# Patient Record
Sex: FEMALE | Race: WHITE | URBAN - METROPOLITAN AREA
[De-identification: names, ages, dates, MRNs, and addresses within clinical notes are randomized per-mention and may not be internally consistent; named-entity substitution may affect disease eponyms.]

---

## 2017-04-11 ENCOUNTER — TELEPHONE (OUTPATIENT)
Dept: OPHTHALMOLOGY | Facility: CLINIC | Age: 82
End: 2017-04-11

## 2017-04-11 NOTE — TELEPHONE ENCOUNTER
Diagnosis questions answered with care facility.  Pt overdue for appt and scheduled appointment next week with dr. erika Benavidez RN 7:51 AM 04/11/17

## 2017-04-20 ENCOUNTER — APPOINTMENT (OUTPATIENT)
Dept: OPHTHALMOLOGY | Facility: CLINIC | Age: 82
End: 2017-04-20
Attending: OPHTHALMOLOGY
Payer: COMMERCIAL

## 2017-04-20 DIAGNOSIS — H04.123 DRY EYES, BILATERAL: ICD-10-CM

## 2017-04-20 DIAGNOSIS — Z96.1 PSEUDOPHAKIA OF BOTH EYES: ICD-10-CM

## 2017-04-20 DIAGNOSIS — H40.1133 PRIMARY OPEN ANGLE GLAUCOMA OF BOTH EYES, SEVERE STAGE: Primary | ICD-10-CM

## 2017-04-20 PROCEDURE — 92083 EXTENDED VISUAL FIELD XM: CPT | Mod: ZF | Performed by: OPHTHALMOLOGY

## 2017-04-20 ASSESSMENT — EXTERNAL EXAM - LEFT EYE: OS_EXAM: NORMAL

## 2017-04-20 ASSESSMENT — PACHYMETRY
OS_CT(UM): 522
OD_CT(UM): 548

## 2017-04-20 ASSESSMENT — SLIT LAMP EXAM - LIDS: COMMENTS: BLEPHARITIS, MGD

## 2017-04-20 ASSESSMENT — VISUAL ACUITY
OS_CC: 20/60
OD_CC+: -1
OS_PH_SC: 20/40+1
OD_SC: 20/70
OD_PH_CC: 20/70 +1
OD_CC: 20/100
OD_PH_SC: 20/50+1
OS_PH_CC: 20/40  -2
CORRECTION_TYPE: GLASSES
METHOD: SNELLEN - LINEAR
OS_CC+: -2

## 2017-04-20 ASSESSMENT — TONOMETRY
OD_IOP_MMHG: UNAB
OS_IOP_MMHG: 12
OD_IOP_MMHG: 33
IOP_METHOD: TONOPEN
OS_IOP_MMHG: 11
IOP_METHOD: APPLANATION

## 2017-04-20 ASSESSMENT — EXTERNAL EXAM - RIGHT EYE: OD_EXAM: NORMAL

## 2017-04-20 ASSESSMENT — CONF VISUAL FIELD
OD_SUPERIOR_TEMPORAL_RESTRICTION: 3
METHOD: COUNTING FINGERS
OD_INFERIOR_NASAL_RESTRICTION: 3
OD_SUPERIOR_NASAL_RESTRICTION: 3
OS_SUPERIOR_TEMPORAL_RESTRICTION: 3
OS_SUPERIOR_NASAL_RESTRICTION: 3

## 2017-04-20 ASSESSMENT — CUP TO DISC RATIO
OD_RATIO: 0.9
OS_RATIO: 0.85

## 2017-04-20 NOTE — NURSING NOTE
Chief Complaints and History of Present Illnesses   Patient presents with     Glaucoma Follow Up     HPI    Affected eye(s):  Both   Symptoms:     Blurred vision   Difficulty with reading   Difficulty watching television   Redness   Foreign body sensation   Tearing   Dryness      Frequency:  Intermittent       Do you have eye pain now?:  No      Comments:  Doing well. No new concerns today. Glaucoma drops being administered by care facility.    Cosopt BID OS  Prednisolone daily OD    Edit: Pt not wearing correct glasses, not hers. Rx incorrect.  Jennie GERMAIN 3:01 PM April 20, 2017

## 2017-04-20 NOTE — PATIENT INSTRUCTIONS
Patient will continue on Cosopt (Timolol/Dorzolamide) which is a blue top drop 2x/day (12 hours apart) in BOTH EYES.  Patient will continue using an eye shield over the right eye while sleeping and refrain from rubbing eyes.  Patient will also continue on Prednisolone 1x/day in the right eye only.  Patient will also continue on artificial tears 4-6x/day in both eyes.  Patient will return to clinic in 1 month with repeat IOP check.

## 2017-04-20 NOTE — PROGRESS NOTES
1)POAG --s/p Ahmed Tube OS (4/11/16),  first dx in 1960, ?s/p Trab in 1980s -- K pachy: 548/522 Tmax: 30s HVF: OD:Superior arcuate and Inf NS and OS:Dense superior and Inferior arcaute, central island CDR:OD:0.7 with loss of inf NRR and OS:0.85 HRT/OCT:Mod-Severe RNFl thinning  FHX of Glc: None Gonio:OD: and OS:open Intolerant to: Asthma/COPD:No Steroid Use: No Kidney Stones: No Sulfa Allergy: Yes, ?sick IOP targets: -- IOP above target OD ?steroid response  2)[place]OL OU -- [place] [facility] [place]  3)H/O Hypotony with choroidals OD -- resolved with new recurrence (8/16) ?2/2 eye rubbing -- needs to wear shield OD qHS at all times  4)KITA/Bleph/Allergic Keratoconjunctivitis -- has seen Dr. 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 Diaz -- improved exam on pred q day    MD:?Patient wearing wrong glasses -- ?etiology of blurred vision  -- pt denies GCA s/s     MD: [facility] administering gtts     Patient will continue on Cosopt (Timolol/Dorzolamide) which is a blue top drop 2x/day (12 hours apart) in BOTH EYES.  Patient will continue using an eye shield over the right eye while sleeping and refrain from rubbing eyes.  Patient will also continue on Prednisolone 1x/day in the right eye only.  Patient will also continue on artificial tears 4-6x/day in both eyes.  Patient will return to clinic in 1 month with repeat IOP check.         Resident Note  S: No new concerns  O: VA down both eyes, possible due to projection versus screen of eye chart  IOP elevated OD today, could not tolerate applanation  Slit lamp with thick mucous on eyelashes OD  Fundus exam stable    Kodi Sweeney MD PGY-3  Ophthalmology    Attending Physician Attestation:  Complete documentation of historical and exam elements from today's encounter can be found in the full encounter summary report (not  reduplicated in this progress note). I personally obtained the chief complaint(s) and history of present illness.  I confirmed and edited as necessary the review of systems, past medical/surgical history, family history, social history, and examination findings as documented by others; and I examined the patient myself. I personally reviewed the relevant tests, images, and reports as documented above. I formulated and edited as necessary the assessment and plan and discussed the findings and management plan with the patient and family.  - Evelyn Knight MD

## 2017-04-20 NOTE — MR AVS SNAPSHOT
After Visit Summary   4/20/2017    Merle Vaughan    MRN: [id number]           Patient Information     Date Of Birth          1935        Visit Information        Provider Department      4/20/2017 1:45 PM Evelyn Knight MD [facility]        Today's Diagnoses     Primary open angle glaucoma of both eyes, severe stage    -  1    Pseudophakia of both eyes        Dry eyes, bilateral          Care Instructions    Patient will continue on Cosopt (Timolol/Dorzolamide) which is a blue top drop 2x/day (12 hours apart) in BOTH EYES.  Patient will continue using an eye shield over the right eye while sleeping and refrain from rubbing eyes.  Patient will also continue on Prednisolone 1x/day in the right eye only.  Patient will also continue on artificial tears 4-6x/day in both eyes.  Patient will return to clinic in 1 month with repeat IOP check.        Follow-ups after your visit        Follow-up notes from your care team     Return in about 1 month (around 5/20/2017).      Who to contact     Please call your clinic at [phone] to:    Ask questions about your health    Make or cancel appointments    Discuss your medicines    Learn about your test results    Speak to your doctor   If you have compliments or concerns about an experience at your clinic, or if you wish to file a complaint, please contact [facility] Patient Relations at [phone] or email us at [email or web address]         Additional Information About Your Visit        MyChart Information     [employer] is an electronic gateway that provides easy, online access to your medical records. With [employer], you can request a clinic appointment, read your test results, renew a prescription or communicate with your care team.     To sign up for [employer] visit the website at [email or web address]   You will be asked to enter the access code listed below, as well as some personal information. Please follow  the directions to create your username and password.     Your access code is: [email or web address]  Expires: 2017  6:31 AM     Your access code will  in 90 days. If you need help or a new code, please contact your [facility] or call [phone] for assistance.        Care EveryWhere ID     This is your Care EveryWhere ID. This could be used by other organizations to access your [place] medical records  [id number]         Blood Pressure from Last 3 Encounters:   No data found for BP    Weight from Last 3 Encounters:   No data found for Wt              We Performed the Following     OVF 24-2 Dynamic OU        Primary Care Provider    Physician No Ref-Primary       No address on file        Thank you!     Thank you for choosing EYE CLINIC  for your care. Our goal is always to provide you with excellent care. Hearing back from our patients is one way we can continue to improve our services. Please take a few minutes to complete the written survey that you may receive in the mail after your visit with us. Thank you!             Your Updated Medication List - Protect others around you: Learn how to safely use, store and throw away your medicines at www.disposemymeds.org.          This list is accurate as of: 17  2:57 PM.  Always use your most recent med list.                   Brand Name Dispense Instructions for use    acetaminophen 325 MG tablet    TYLENOL     Take 650 mg by mouth       ALPHAGAN OP      Place 1 drop into the right eye 2 times daily       Calcium Carbonate 1250 (500 CA) MG Caps      Take 15 mLs by mouth Maalox oral       carboxymethylcellulose 0.5 % Soln ophthalmic solution    REFRESH PLUS     One drop in each eye 4 times daily as needed       clonazePAM 0.5 MG tablet    klonoPIN     Take 0.5 mg by mouth       dorzolamide-timolol 2-0.5 % ophthalmic solution    COSOPT     Place 1 drop into BOTH eyes twice daily.       latanoprost 0.005 % ophthalmic solution     XALATAN     Place 1 drop into the right eye At Bedtime       melatonin 1 MG Caps      Take 5 mg by mouth       mirtazapine 7.5 MG Tabs tablet    REMERON     Take 30 mg by mouth       moxifloxacin 0.5 % ophthalmic solution    VIGAMOX     Place 1 drop into the right eye 6 times daily       prednisoLONE acetate 1 % ophthalmic susp    PRED FORTE     Place 1 drop into the right eye 3 times daily       SIMVASTATIN PO      Take 20 mg by mouth daily

## 2017-06-29 ENCOUNTER — OFFICE VISIT (OUTPATIENT)
Dept: OPHTHALMOLOGY | Facility: CLINIC | Age: 82
End: 2017-06-29
Attending: OPHTHALMOLOGY
Payer: COMMERCIAL

## 2017-06-29 DIAGNOSIS — H40.1133 PRIMARY OPEN ANGLE GLAUCOMA OF BOTH EYES, SEVERE STAGE: Primary | ICD-10-CM

## 2017-06-29 DIAGNOSIS — Z96.1 PSEUDOPHAKIA OF BOTH EYES: ICD-10-CM

## 2017-06-29 PROCEDURE — 92015 DETERMINE REFRACTIVE STATE: CPT | Mod: ZF

## 2017-06-29 PROCEDURE — 99213 OFFICE O/P EST LOW 20 MIN: CPT | Mod: ZF

## 2017-06-29 ASSESSMENT — TONOMETRY
IOP_METHOD: APPLANATION
OS_IOP_MMHG: 18
OS_IOP_MMHG: 26
OD_IOP_MMHG: 34
IOP_METHOD: APPLANATION
OD_IOP_MMHG: 34

## 2017-06-29 ASSESSMENT — REFRACTION_MANIFEST
OS_ADD: +2.75
OD_AXIS: 101
OS_CYLINDER: +1.25
OD_CYLINDER: +1.50
OS_SPHERE: -1.75
OS_AXIS: 074
OD_SPHERE: -0.50
OD_ADD: +2.75

## 2017-06-29 ASSESSMENT — SLIT LAMP EXAM - LIDS: COMMENTS: BLEPHARITIS, MGD

## 2017-06-29 ASSESSMENT — VISUAL ACUITY
METHOD: SNELLEN - LINEAR
OS_CC: 20/50
OS_PH_CC: 20/40
OD_CC: 20/100

## 2017-06-29 ASSESSMENT — EXTERNAL EXAM - RIGHT EYE: OD_EXAM: NORMAL

## 2017-06-29 ASSESSMENT — CONF VISUAL FIELD: OD_SUPERIOR_NASAL_RESTRICTION: 3

## 2017-06-29 ASSESSMENT — CUP TO DISC RATIO
OS_RATIO: 0.85
OD_RATIO: 0.9

## 2017-06-29 ASSESSMENT — REFRACTION_WEARINGRX
OD_AXIS: 015
OS_SPHERE: +1.75
OD_SPHERE: +1.50
OD_CYLINDER: +0.75

## 2017-06-29 ASSESSMENT — EXTERNAL EXAM - LEFT EYE: OS_EXAM: NORMAL

## 2017-06-29 NOTE — MR AVS SNAPSHOT
After Visit Summary   6/29/2017    Merle Vaughan    MRN: [id number]           Patient Information     Date Of Birth          1935        Visit Information        Provider Department      6/29/2017 12:45 PM Evelyn Knight MD [facility] Clinic        Today's Diagnoses     Primary open angle glaucoma of both eyes, severe stage    -  1    Pseudophakia of both eyes          Care Instructions    Patient will continue on Cosopt (Timolol/Dorzolamide) which is a blue top drop 2x/day (12 hours apart) in BOTH EYES and start Lumigan which is a teal top drop at bedtime in the RIGHT EYE ONLY. Patient will also discontinue the Prednisolone and start FML 2x/day in the RIGHT EYE ONLY.   Patient will continue using an eye shield over the right eye while sleeping and refrain from rubbing eyes.    Patient will also continue on artificial tears 4-6x/day in both eyes.  Patient will return to clinic in 1 month with repeat IOP check.          Follow-ups after your visit        Who to contact     Please call your clinic at [phone] to:    Ask questions about your health    Make or cancel appointments    Discuss your medicines    Learn about your test results    Speak to your doctor   If you have compliments or concerns about an experience at your clinic, or if you wish to file a complaint, please contact [facility] Patient Relations at [phone] or email us at [email or web address]         Additional Information About Your Visit        MyChart Information     [employer] is an electronic gateway that provides easy, online access to your medical records. With [employer], you can request a clinic appointment, read your test results, renew a prescription or communicate with your care team.     To sign up for [employer] visit the website at [email or web address]   You will be asked to enter the access code listed below, as well as some personal information. Please follow the directions  to create your username and password.     Your access code is: [email or web address]  Expires: 2017  6:31 AM     Your access code will  in 90 days. If you need help or a new code, please contact your [facility] or call [phone] for assistance.        Care EveryWhere ID     This is your Care EveryWhere ID. This could be used by other organizations to access your [place] medical records  [id number]         Blood Pressure from Last 3 Encounters:   No data found for BP    Weight from Last 3 Encounters:   No data found for Wt              Today, you had the following     No orders found for display       Primary Care Provider    Physician No Ref-Primary       No address on file        Equal Access to Services     ALBINO CALLAWAY : David Greco, wamary hernandez, qaceci kaalmada stephie, ernie rodriguez . So [facility] [phone].    ATENCIÓN: Si habla español, tiene a solis disposición servicios gratuitos de asistencia lingüística. Llame al [phone].    We comply with applicable federal civil rights laws and [place] laws. We do not discriminate on the basis of race, color, national origin, age, disability sex, sexual orientation or gender identity.            Thank you!     Thank you for choosing EYE CLINIC  for your care. Our goal is always to provide you with excellent care. Hearing back from our patients is one way we can continue to improve our services. Please take a few minutes to complete the written survey that you may receive in the mail after your visit with us. Thank you!             Your Updated Medication List - Protect others around you: Learn how to safely use, store and throw away your medicines at www.disposemymeds.org.          This list is accurate as of: 17  1:31 PM.  Always use your most recent med list.                   Brand Name Dispense Instructions for use Diagnosis    acetaminophen 325 MG tablet    TYLENOL      Take 650 mg by mouth        ALPHAGAN OP      Place 1 drop into the right eye 2 times daily        Calcium Carbonate 1250 (500 CA) MG Caps      Take 15 mLs by mouth Maalox oral        carboxymethylcellulose 0.5 % Soln ophthalmic solution    REFRESH PLUS     One drop in each eye 4 times daily as needed        clonazePAM 0.5 MG tablet    klonoPIN     Take 0.5 mg by mouth        dorzolamide-timolol 2-0.5 % ophthalmic solution    COSOPT     Place 1 drop into BOTH eyes twice daily.        latanoprost 0.005 % ophthalmic solution    XALATAN     Place 1 drop into the right eye At Bedtime    Primary open angle glaucoma of both eyes, severe stage       melatonin 1 MG Caps      Take 5 mg by mouth        mirtazapine 7.5 MG Tabs tablet    REMERON     Take 30 mg by mouth        moxifloxacin 0.5 % ophthalmic solution    VIGAMOX     Place 1 drop into the right eye 6 times daily        prednisoLONE acetate 1 % ophthalmic susp    PRED FORTE     Place 1 drop into the right eye 3 times daily        SIMVASTATIN PO      Take 20 mg by mouth daily

## 2017-06-29 NOTE — PROGRESS NOTES
1)POAG --s/p Ahmed Tube OS (4/11/16),  first dx in 1960, ?s/p Trab in 1980s -- K pachy: 548/522 Tmax: 30s HVF: OD:Superior arcuate and Inf NS and OS:Dense superior and Inferior arcaute, central island CDR:OD:0.7 with loss of inf NRR and OS:0.85 HRT/OCT:Mod-Severe RNFl thinning  FHX of Glc: None Gonio:OD: and OS:open Intolerant to: Asthma/COPD:No Steroid Use: No Kidney Stones: No Sulfa Allergy: Yes, ?sick IOP targets: -- IOP above target OD ?steroid response  2)PCIOL OU -- [place] [facility] [place] lives in [place]  3)H/O Hypotony with choroidals OD -- resolved with new recurrence (8/16) ?2/2 eye rubbing -- needs to wear shield OD qHS at all times  4)KITA/Bleph/Allergic Keratoconjunctivitis -- has seen Dr. 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 Diaz -- improved exam on pred q day    MD: [facility] administering gtts     Patient will continue on Cosopt (Timolol/Dorzolamide) which is a blue top drop 2x/day (12 hours apart) in BOTH EYES and start Lumigan which is a teal top drop at bedtime in the RIGHT EYE ONLY. Patient will also discontinue the Prednisolone and start FML 2x/day in the RIGHT EYE ONLY.   Patient will continue using an eye shield over the right eye while sleeping and refrain from rubbing eyes.    Patient will also continue on artificial tears 4-6x/day in both eyes.  Patient will return to clinic in 1 month with repeat IOP check.     Attending Physician Attestation:  Complete documentation of historical and exam elements from today's encounter can be found in the full encounter summary report (not reduplicated in this progress note). I personally obtained the chief complaint(s) and history of present illness.  I confirmed and edited as necessary the review of systems, past medical/surgical history, family history, social history, and examination findings as documented by  others; and I examined the patient myself. I personally reviewed the relevant tests, images, and reports as documented above. I formulated and edited as necessary the assessment and plan and discussed the findings and management plan with the patient and family.  - Evelyn Knight MD

## 2017-06-29 NOTE — PATIENT INSTRUCTIONS
Patient will continue on Cosopt (Timolol/Dorzolamide) which is a blue top drop 2x/day (12 hours apart) in BOTH EYES and start Lumigan which is a teal top drop at bedtime in the RIGHT EYE ONLY. Patient will also discontinue the Prednisolone and start FML 2x/day in the RIGHT EYE ONLY.   Patient will continue using an eye shield over the right eye while sleeping and refrain from rubbing eyes.    Patient will also continue on artificial tears 4-6x/day in both eyes.  Patient will return to clinic in 1 month with repeat IOP check.

## 2017-06-29 NOTE — NURSING NOTE
Chief Complaints and History of Present Illnesses   Patient presents with     Follow Up For     2 month follow up POAG BE     HPI    Affected eye(s):  Both   Symptoms:     No floaters   No flashes   No glare   No halos      Duration:  2 months   Frequency:  Constant       Do you have eye pain now?:  No      Comments:  2 month follow up POAG  cosopt BID BE  Prednisolone QD RE  Tears QID BE  Perri GERMAIN 12:44 PM June 29, 2017

## 2017-12-14 ENCOUNTER — OFFICE VISIT (OUTPATIENT)
Dept: OPHTHALMOLOGY | Facility: CLINIC | Age: 82
End: 2017-12-14
Attending: OPHTHALMOLOGY
Payer: COMMERCIAL

## 2017-12-14 DIAGNOSIS — H40.1133 PRIMARY OPEN ANGLE GLAUCOMA OF BOTH EYES, SEVERE STAGE: Primary | ICD-10-CM

## 2017-12-14 DIAGNOSIS — Z96.1 PSEUDOPHAKIA OF BOTH EYES: ICD-10-CM

## 2017-12-14 PROCEDURE — 99213 OFFICE O/P EST LOW 20 MIN: CPT | Mod: ZF

## 2017-12-14 ASSESSMENT — EXTERNAL EXAM - LEFT EYE: OS_EXAM: NORMAL

## 2017-12-14 ASSESSMENT — CONF VISUAL FIELD
METHOD: COUNTING FINGERS
OD_SUPERIOR_NASAL_RESTRICTION: 3
OS_NORMAL: 1

## 2017-12-14 ASSESSMENT — VISUAL ACUITY
OS_SC: 20/40
METHOD: SNELLEN - LINEAR
OD_SC: 20/100
OD_PH_SC: 20/80
OS_PH_SC: 20/30

## 2017-12-14 ASSESSMENT — CUP TO DISC RATIO
OS_RATIO: 0.8
OD_RATIO: 0.9

## 2017-12-14 ASSESSMENT — TONOMETRY
OS_IOP_MMHG: 13
IOP_METHOD: APPLANATION
IOP_METHOD: APPLANATION
OD_IOP_MMHG: 26
OD_IOP_MMHG: 29
OS_IOP_MMHG: 16

## 2017-12-14 ASSESSMENT — REFRACTION_WEARINGRX
OD_CYLINDER: +0.75
OS_SPHERE: +1.75
OD_SPHERE: +1.50
OD_AXIS: 015

## 2017-12-14 ASSESSMENT — SLIT LAMP EXAM - LIDS
COMMENTS: BLEPHARITIS, MGD
COMMENTS: BLEPHARITIS, MGD

## 2017-12-14 ASSESSMENT — EXTERNAL EXAM - RIGHT EYE: OD_EXAM: NORMAL

## 2017-12-14 NOTE — NURSING NOTE
Chief Complaints and History of Present Illnesses   Patient presents with     Follow Up For     POAG      HPI    Affected eye(s):  Both   Symptoms:        Frequency:  Constant       Do you have eye pain now?:  No      Comments:  States va is the same since last visit  No F&F  Romana FINN 9:01 AM December 14, 2017

## 2017-12-14 NOTE — MR AVS SNAPSHOT
After Visit Summary   12/14/2017    Merle Vaughan    MRN: [id number]           Patient Information     Date Of Birth          1935        Visit Information        Provider Department      12/14/2017 8:45 AM Evelyn Knight MD [facility] Clinic        Today's Diagnoses     Primary open angle glaucoma of both eyes, severe stage    -  1    Pseudophakia of both eyes          Care Instructions    Patient will continue on Cosopt (Timolol/Dorzolamide) which is a blue top drop 2x/day (12 hours apart) in BOTH EYES and Lumigan which is a teal top drop at bedtime in the RIGHT EYE ONLY. Patient will also continue on FML 1x/day in the RIGHT EYE ONLY and start Alphagan (Brimonidine) which is a purple top drop 2x/day (12 hours apart) in the RIGHT EYE ONLY.     Patient will continue using an eye shield over the right eye while sleeping and refrain from rubbing eyes.        Patient will also continue on artificial tears 4-6x/day in both eyes.      Patient will return to clinic in 2-3 weeks with repeat IOP check, visual field test and discussion regarding possible tube surgery in the right eye.               Follow-ups after your visit        Follow-up notes from your care team     Return 2-3 weeks with repeat IOP check, visual field test and discussion regarding possible tube surgery in.      Your next 10 appointments already scheduled     Jan 04, 2018 10:30 AM CST   RETURN GLAUCOMA with Evelyn Knight MD   Eye Clinic ([facility])    [facility]  [address]  [address]  [facility] [address]   [phone]            Jan 25, 2018 10:30 AM CST   Post-Op with Evelyn Knight MD   Eye Clinic ([facility])    T[facility] Pereira  [address]  [address]  [facility] [address]   [phone]            Jan 30, 2018  1:00 PM CST   Post-Op with Evelyn Knight MD   Eye Clinic ([facility])    Taran Pereira  [address]  [address]  [facility] [address]   [phone]              Who to contact     Please call your clinic at [phone] to:    Ask questions about your health    Make or cancel appointments    Discuss your medicines    Learn about your test results    Speak to your doctor   If you have compliments or concerns about an experience at your clinic, or if you wish to file a complaint, please contact [facility] Phys[facility] Patient Relations at [phone] or email us at [email or web address]         Additional Information About Your Visit        [employer] Information     [employer] is an electronic gateway that provides easy, online access to your medical records. With [employer], you can request a clinic appointment, read your test results, renew a prescription or communicate with your care team.     To sign up for [employer] visit the website at [email or web address]   You will be asked to enter the access code listed below, as well as some personal information. Please follow the directions to create your username and password.     Your access code is: [email or web address]  Expires: 2018  6:30 AM     Your access code will  in 90 days. If you need help or a new code, please contact your [facility] or call [phone] for assistance.        Care EveryWhere ID     This is your Care EveryWhere ID. This could be used by other organizations to access your [place] medical records  [id number]         Blood Pressure from Last 3 Encounters:   No data found for BP    Weight from Last 3 Encounters:   No data found for Wt              We Performed the Following     Deisy-Operative Worksheet (Glaucoma)        Primary Care Provider Fax #    Physician No Ref-Primary [phone]       No address on file        Equal Access to Services     ALBINO CALLAWAY : mik Dodd, ernie quinteros. So [facility]  [phone].    ATENCIÓN: Si luis angel mcgee, tiene a solis disposición servicios gratuitos de asistencia lingüística. Demar dowling [phone].    We comply with applicable federal civil rights laws and [place] laws. We do not discriminate on the basis of race, color, national origin, age, disability, sex, sexual orientation, or gender identity.            Thank you!     Thank you for choosing EYE CLINIC  for your care. Our goal is always to provide you with excellent care. Hearing back from our patients is one way we can continue to improve our services. Please take a few minutes to complete the written survey that you may receive in the mail after your visit with us. Thank you!             Your Updated Medication List - Protect others around you: Learn how to safely use, store and throw away your medicines at www.disposemymeds.org.          This list is accurate as of: 12/14/17 10:26 AM.  Always use your most recent med list.                   Brand Name Dispense Instructions for use Diagnosis    acetaminophen 325 MG tablet    TYLENOL     Take 650 mg by mouth        ALPHAGAN OP      Place 1 drop into the right eye 2 times daily        Calcium Carbonate 1250 (500 CA) MG Caps      Take 15 mLs by mouth Maalox oral        carboxymethylcellulose 0.5 % Soln ophthalmic solution    REFRESH PLUS     One drop in each eye 4 times daily as needed        clonazePAM 0.5 MG tablet    klonoPIN     Take 0.5 mg by mouth        dorzolamide-timolol 2-0.5 % ophthalmic solution    COSOPT     Place 1 drop into BOTH eyes twice daily.        latanoprost 0.005 % ophthalmic solution    XALATAN     Place 1 drop into the right eye At Bedtime    Primary open angle glaucoma of both eyes, severe stage       melatonin 1 MG Caps      Take 5 mg by mouth        mirtazapine 7.5 MG Tabs tablet    REMERON     Take 30 mg by mouth        moxifloxacin 0.5 % ophthalmic solution    VIGAMOX     Place 1 drop into the right eye 6 times daily        prednisoLONE  acetate 1 % ophthalmic susp    PRED FORTE     Place 1 drop into the right eye 3 times daily        SIMVASTATIN PO      Take 20 mg by mouth daily

## 2017-12-14 NOTE — PROGRESS NOTES
1)POAG --s/p Ahmed Tube OS (4/11/16),  first dx in 1960, ?s/p Trab in 1980s -- K pachy: 548/522 Tmax: 30s HVF: OD:Superior arcuate and Inf NS and OS:Dense superior and Inferior arcaute, central island CDR:OD:0.9 (prog noted in 2017 after being lost to f/u) and OS:0.85 HRT/OCT:Mod-Severe RNFl thinning  FHX of Glc: None Gonio:OD: and OS:open Intolerant to: Asthma/COPD:No Steroid Use: No Kidney Stones: No Sulfa Allergy: Yes, ?sick IOP targets: -- IOP above target OD ?steroid response  2)PCIOL OU -- [place] [facility] [place] son (gurjit) lives in [place]  3)H/O Hypotony with choroidals OD -- resolved with new recurrence (8/16) ?2/2 eye rubbing -- needs to wear shield OD qHS at all times  4)KITA/Bleph/Allergic Keratoconjunctivitis -- has seen Dr. 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 Diaz -- stable on FML BID    MD: [facility] administering gtts     MD:pt lost to f/u from 6/17 to 12/17 -- pt wants me to call Gurjit [phone]    Patient will continue on Cosopt (Timolol/Dorzolamide) which is a blue top drop 2x/day (12 hours apart) in BOTH EYES and Lumigan which is a teal top drop at bedtime in the RIGHT EYE ONLY. Patient will also continue on FML 1x/day in the RIGHT EYE ONLY and start Alphagan (Brimonidine) which is a purple top drop 2x/day (12 hours apart) in the RIGHT EYE ONLY.     Patient will continue using an eye shield over the right eye while sleeping and refrain from rubbing eyes.        Patient will also continue on artificial tears 4-6x/day in both eyes.      Patient will return to clinic in 2-3 weeks with repeat IOP check, visual field test and discussion regarding possible tube surgery in the right eye.     Attending Physician Attestation:  Complete documentation of historical and exam elements from today's encounter can be found in the full encounter summary report (not reduplicated in  this progress note). I personally obtained the chief complaint(s) and history of present illness.  I confirmed and edited as necessary the review of systems, past medical/surgical history, family history, social history, and examination findings as documented by others; and I examined the patient myself. I personally reviewed the relevant tests, images, and reports as documented above. I formulated and edited as necessary the assessment and plan and discussed the findings and management plan with the patient and family.  - Evelyn Knight MD     Resident Note  Per [facility] record, patient still using FML twice a day right eye. Recommend stopping FML. Recheck IOP one month. Considering adding alphagan vs tube right eye at that time.    Franco Browning MD  PGY3, Dept of Ophthalmology

## 2017-12-14 NOTE — PATIENT INSTRUCTIONS
Patient will continue on Cosopt (Timolol/Dorzolamide) which is a blue top drop 2x/day (12 hours apart) in BOTH EYES and Lumigan which is a teal top drop at bedtime in the RIGHT EYE ONLY. Patient will also continue on FML 1x/day in the RIGHT EYE ONLY and start Alphagan (Brimonidine) which is a purple top drop 2x/day (12 hours apart) in the RIGHT EYE ONLY.     Patient will continue using an eye shield over the right eye while sleeping and refrain from rubbing eyes.        Patient will also continue on artificial tears 4-6x/day in both eyes.      Patient will return to clinic in 2-3 weeks with repeat IOP check, visual field test and discussion regarding possible tube surgery in the right eye.

## 2018-01-04 ENCOUNTER — OFFICE VISIT (OUTPATIENT)
Dept: OPHTHALMOLOGY | Facility: CLINIC | Age: 83
End: 2018-01-04
Attending: OPHTHALMOLOGY
Payer: COMMERCIAL

## 2018-01-04 DIAGNOSIS — H40.1133 PRIMARY OPEN ANGLE GLAUCOMA OF BOTH EYES, SEVERE STAGE: ICD-10-CM

## 2018-01-04 DIAGNOSIS — Z96.1 PSEUDOPHAKIA OF BOTH EYES: ICD-10-CM

## 2018-01-04 DIAGNOSIS — H40.1190 POAG (PRIMARY OPEN-ANGLE GLAUCOMA): Primary | ICD-10-CM

## 2018-01-04 PROCEDURE — 92083 EXTENDED VISUAL FIELD XM: CPT | Mod: ZF | Performed by: OPHTHALMOLOGY

## 2018-01-04 PROCEDURE — G0463 HOSPITAL OUTPT CLINIC VISIT: HCPCS | Mod: ZF

## 2018-01-04 ASSESSMENT — CONF VISUAL FIELD
OD_INFERIOR_NASAL_RESTRICTION: 3
OS_SUPERIOR_TEMPORAL_RESTRICTION: 3
OD_INFERIOR_TEMPORAL_RESTRICTION: 3
OD_SUPERIOR_NASAL_RESTRICTION: 1
OD_SUPERIOR_TEMPORAL_RESTRICTION: 3
OS_SUPERIOR_NASAL_RESTRICTION: 3

## 2018-01-04 ASSESSMENT — VISUAL ACUITY
OS_SC+: -2
OD_SC+: +1
OS_SC: 20/30
OD_SC: 20/100
METHOD: SNELLEN - LINEAR

## 2018-01-04 ASSESSMENT — REFRACTION_WEARINGRX
OS_SPHERE: +1.75
OD_AXIS: 015
OD_CYLINDER: +0.75
OD_SPHERE: +1.50

## 2018-01-04 ASSESSMENT — SLIT LAMP EXAM - LIDS
COMMENTS: BLEPHARITIS, MGD
COMMENTS: BLEPHARITIS, MGD

## 2018-01-04 ASSESSMENT — TONOMETRY
OD_IOP_MMHG: 15
IOP_METHOD: TONOPEN
OS_IOP_MMHG: 15
OD_IOP_MMHG: 15
OS_IOP_MMHG: 13
IOP_METHOD: APPLANATION

## 2018-01-04 ASSESSMENT — EXTERNAL EXAM - LEFT EYE: OS_EXAM: NORMAL

## 2018-01-04 ASSESSMENT — EXTERNAL EXAM - RIGHT EYE: OD_EXAM: NORMAL

## 2018-01-04 ASSESSMENT — CUP TO DISC RATIO
OD_RATIO: 0.9
OS_RATIO: 0.8

## 2018-01-04 NOTE — MR AVS SNAPSHOT
After Visit Summary   1/4/2018    Merle Vaughan    MRN: [id number]           Patient Information     Date Of Birth          1935        Visit Information        Provider Department      1/4/2018 10:30 AM Evelyn Knight MD [facility]        Today's Diagnoses     POAG (primary open-angle glaucoma)    -  1    Primary open angle glaucoma of both eyes, severe stage        Pseudophakia of both eyes          Care Instructions    Patient will continue on Cosopt (Timolol/Dorzolamide) which is a blue top drop 2x/day (12 hours apart) in BOTH EYES and Lumigan which is a teal top drop at bedtime in the RIGHT EYE ONLY. Patient will also continue on FML 1x/day in the RIGHT EYE ONLY and Alphagan (Brimonidine) which is a purple top drop 2x/day (12 hours apart) in the RIGHT EYE ONLY.     Patient will continue using an eye shield over the right eye while sleeping and refrain from rubbing eyes.        Patient will also continue on artificial tears 4-6x/day in both eyes.      Patient will return to clinic in 1-2 months with repeat IOP check, visual field test (OD:LVC only) and discussion regarding possible tube surgery in the right eye.          Follow-ups after your visit        Follow-up notes from your care team     Return 2 months with repeat IOP check, visual field test (OD:LVC only) .      Your next 10 appointments already scheduled     Jan 24, 2018   Procedure with Evelyn Knight MD   [facility] ([facility])    [address] [place], [place] [id number] [phone]           Located in the Clinics and Surgery Center at [address] [place] [address].   parking is very convenient and highly recommended.  is a $6 flat rate fee.  Both  and self parkers should enter the main arrival plaza from [facility]; parking attendants will direct you based on your parking preference.            Jan 25, 2018 10:30 AM CST    Post-Op with Evelyn Knight MD   Eye Clinic ([facility])    [address]  [address]  [facility] [place] [address]   [phone]            2018  1:00 PM CST   Post-Op with Evelyn Knight MD   Eye Clinic ([facility])    [address]  [address]  [address]  [facility] [place] [address]   [phone]              Who to contact     Please call your clinic at [phone] to:    Ask questions about your health    Make or cancel appointments    Discuss your medicines    Learn about your test results    Speak to your doctor   If you have compliments or concerns about an experience at your clinic, or if you wish to file a complaint, please contact [facility] Patient Relations at [phone] or email us at [email or web address]         Additional Information About Your Visit        [employer] Information     [employer] is an electronic gateway that provides easy, online access to your medical records. With [employer], you can request a clinic appointment, read your test results, renew a prescription or communicate with your care team.     To sign up for [employer] visit the website at [email or web address]   You will be asked to enter the access code listed below, as well as some personal information. Please follow the directions to create your username and password.     Your access code is: [email or web address]  Expires: 2018  6:30 AM     Your access code will  in 90 days. If you need help or a new code, please contact your [facility] or call [phone] for assistance.        Care EveryWhere ID     This is your Care EveryWhere ID. This could be used by other organizations to access your [place] medical records  [id number]         Blood Pressure from Last 3 Encounters:   No data found for BP    Weight from Last 3 Encounters:   No data found for Wt              We  Performed the Following     OVF 24-2 Dynamic OU        Primary Care Provider Fax #    Physician No Ref-Primary [phone]       No address on file        Equal Access to Services     ALBINO CALLAWAY : Hadii aad ku hadmauricepaolo Greco, mik geminidarinha, rosy card, ernie harriscally green. So [facility] [phone].    ATENCIÓN: Si habla español, tiene a solis disposición servicios gratuitos de asistencia lingüística. Llame al [phone].    We comply with applicable federal civil rights laws and [place] laws. We do not discriminate on the basis of race, color, national origin, age, disability, sex, sexual orientation, or gender identity.            Thank you!     Thank you for choosing [facility]  for your care. Our goal is always to provide you with excellent care. Hearing back from our patients is one way we can continue to improve our services. Please take a few minutes to complete the written survey that you may receive in the mail after your visit with us. Thank you!             Your Updated Medication List - Protect others around you: Learn how to safely use, store and throw away your medicines at www.disposemymeds.org.          This list is accurate as of: 1/4/18 12:12 PM.  Always use your most recent med list.                   Brand Name Dispense Instructions for use Diagnosis    acetaminophen 325 MG tablet    TYLENOL     Take 650 mg by mouth        ALPHAGAN OP      Place 1 drop into the right eye 2 times daily        Calcium Carbonate 1250 (500 CA) MG Caps      Take 15 mLs by mouth Maalox oral        carboxymethylcellulose 0.5 % Soln ophthalmic solution    REFRESH PLUS     One drop in each eye 4 times daily as needed        clonazePAM 0.5 MG tablet    klonoPIN     Take 0.5 mg by mouth        dorzolamide-timolol 2-0.5 % ophthalmic solution    COSOPT     Place 1 drop into BOTH eyes twice daily.        FLUOROMETHOLONE OP      Apply 1 drop to eye daily Right eye, per med sheet with pt         latanoprost 0.005 % ophthalmic solution    XALATAN     Place 1 drop into the right eye At Bedtime    Primary open angle glaucoma of both eyes, severe stage       melatonin 1 MG Caps      Take 5 mg by mouth        mirtazapine 7.5 MG Tabs tablet    REMERON     Take 30 mg by mouth        moxifloxacin 0.5 % ophthalmic solution    VIGAMOX     Place 1 drop into the right eye 6 times daily        prednisoLONE acetate 1 % ophthalmic susp    PRED FORTE     Place 1 drop into the right eye 3 times daily        SIMVASTATIN PO      Take 20 mg by mouth daily

## 2018-01-04 NOTE — PROGRESS NOTES
1)POAG --s/p Ahmed Tube OS (4/11/16),  first dx in 1960, ?s/p Trab in 1980s, progression OD in 2018 when pt lost to f/u  -- K pachy: 548/522 Tmax: 30s HVF: OD:Central island in 2018 (prog when pt lost to f/u) and Superior arcuate and Inf NS in [id number] and OS:Dense superior and Inferior arcaute, central island CDR:OD:0.9 (prog noted in 2017 after being lost to f/u) and OS:0.85 HRT/OCT:Mod-Severe RNFl thinning  FHX of Glc: None Gonio:OD: and OS:open Intolerant to: Asthma/COPD:No Steroid Use: No Kidney Stones: No Sulfa Allergy: Yes, ?sick IOP targets: -- IOP improved  2)PCIOL OU -- [place] [facility] [place], son (gurjit) lives in [place]  3)H/O Hypotony with choroidals OD -- resolved with new recurrence (8/16) ?2/2 eye rubbing -- needs to wear shield OD qHS at all times  4)KITA/Bleph/Allergic Keratoconjunctivitis -- has seen Dr. 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 Diaz -- was stable on FML BID    MD: [facility] administering gtts     MD:pt lost to f/u from 6/17 to 12/17  -- etiology of progression OD -- pt wants me to call Gurjit [phone]    Patient will continue on Cosopt (Timolol/Dorzolamide) which is a blue top drop 2x/day (12 hours apart) in BOTH EYES and Lumigan which is a teal top drop at bedtime in the RIGHT EYE ONLY. Patient will also continue on FML 1x/day in the RIGHT EYE ONLY and Alphagan (Brimonidine) which is a purple top drop 2x/day (12 hours apart) in the RIGHT EYE ONLY.     Patient will continue using an eye shield over the right eye while sleeping and refrain from rubbing eyes.        Patient will also continue on artificial tears 4-6x/day in both eyes.      Patient will return to clinic in 1-2 months with repeat IOP check, visual field test (OD:LVC only) and discussion regarding possible tube surgery in the right eye.     Attending Physician Attestation:   Complete documentation of historical and exam elements from today's encounter can be found in the full encounter summary report (not reduplicated in this progress note). I personally obtained the chief complaint(s) and history of present illness.  I confirmed and edited as necessary the review of systems, past medical/surgical history, family history, social history, and examination findings as documented by others; and I examined the patient myself. I personally reviewed the relevant tests, images, and reports as documented above. I formulated and edited as necessary the assessment and plan and discussed the findings and management plan with the patient and family.  - Evelyn Knight MD     Resident Note  Broke glasses. IOP improved after decreasing FML to 1x day and starting alphagan. Clear progression on visual field but not to confrontation. Recommend continue cosopt both eyes, lumigan right eye, alphagan right eye and fml 1xdaily right eye. Return to clinic 1 month [facility]    MRx from 6/2017 reginaldo Browning MD  PGY3, Dept of Ophthalmology

## 2018-01-04 NOTE — PATIENT INSTRUCTIONS
Patient will continue on Cosopt (Timolol/Dorzolamide) which is a blue top drop 2x/day (12 hours apart) in BOTH EYES and Lumigan which is a teal top drop at bedtime in the RIGHT EYE ONLY. Patient will also continue on FML 1x/day in the RIGHT EYE ONLY and Alphagan (Brimonidine) which is a purple top drop 2x/day (12 hours apart) in the RIGHT EYE ONLY.     Patient will continue using an eye shield over the right eye while sleeping and refrain from rubbing eyes.        Patient will also continue on artificial tears 4-6x/day in both eyes.      Patient will return to clinic in 1-2 months with repeat IOP check, visual field test (OD:LVC only) and discussion regarding possible tube surgery in the right eye.

## 2018-01-09 ENCOUNTER — RECORDS - HEALTHEAST (OUTPATIENT)
Dept: LAB | Facility: CLINIC | Age: 83
End: 2018-01-09

## 2018-01-10 LAB
ANION GAP SERPL CALCULATED.3IONS-SCNC: 5 MMOL/L (ref 5–18)
BASOPHILS # BLD AUTO: 0 THOU/UL (ref 0–0.2)
BASOPHILS NFR BLD AUTO: 1 % (ref 0–2)
BUN SERPL-MCNC: 16 MG/DL (ref 8–28)
CALCIUM SERPL-MCNC: 9.4 MG/DL (ref 8.5–10.5)
CHLORIDE BLD-SCNC: 103 MMOL/L (ref 98–107)
CO2 SERPL-SCNC: 30 MMOL/L (ref 22–31)
CREAT SERPL-MCNC: 0.59 MG/DL (ref 0.6–1.1)
EOSINOPHIL # BLD AUTO: 0.5 THOU/UL (ref 0–0.4)
EOSINOPHIL NFR BLD AUTO: 8 % (ref 0–6)
ERYTHROCYTE [DISTWIDTH] IN BLOOD BY AUTOMATED COUNT: 13.7 % (ref 11–14.5)
GFR SERPL CREATININE-BSD FRML MDRD: >60 ML/MIN/1.73M2
GLUCOSE BLD-MCNC: 67 MG/DL (ref 70–125)
HCT VFR BLD AUTO: 35.2 % (ref 35–47)
HGB BLD-MCNC: 11.5 G/DL (ref 12–16)
LYMPHOCYTES # BLD AUTO: 1.9 THOU/UL (ref 0.8–4.4)
LYMPHOCYTES NFR BLD AUTO: 32 % (ref 20–40)
MCH RBC QN AUTO: 30.5 PG (ref 27–34)
MCHC RBC AUTO-ENTMCNC: 32.7 G/DL (ref 32–36)
MCV RBC AUTO: 93 FL (ref 80–100)
MONOCYTES # BLD AUTO: 0.9 THOU/UL (ref 0–0.9)
MONOCYTES NFR BLD AUTO: 14 % (ref 2–10)
NEUTROPHILS # BLD AUTO: 2.7 THOU/UL (ref 2–7.7)
NEUTROPHILS NFR BLD AUTO: 45 % (ref 50–70)
PLATELET # BLD AUTO: 307 THOU/UL (ref 140–440)
PMV BLD AUTO: 11 FL (ref 8.5–12.5)
POTASSIUM BLD-SCNC: 3.9 MMOL/L (ref 3.5–5)
RBC # BLD AUTO: 3.77 MILL/UL (ref 3.8–5.4)
SODIUM SERPL-SCNC: 138 MMOL/L (ref 136–145)
WBC: 6 THOU/UL (ref 4–11)

## 2018-01-23 ENCOUNTER — ANESTHESIA EVENT (OUTPATIENT)
Dept: SURGERY | Facility: AMBULATORY SURGERY CENTER | Age: 83
End: 2018-01-23

## 2018-01-24 ENCOUNTER — ANESTHESIA (OUTPATIENT)
Dept: SURGERY | Facility: AMBULATORY SURGERY CENTER | Age: 83
End: 2018-01-24

## 2018-01-24 ENCOUNTER — SURGERY (OUTPATIENT)
Age: 83
End: 2018-01-24

## 2018-01-24 ENCOUNTER — HOSPITAL ENCOUNTER (OUTPATIENT)
Facility: AMBULATORY SURGERY CENTER | Age: 83
End: 2018-01-24
Attending: OPHTHALMOLOGY
Payer: COMMERCIAL

## 2018-01-24 VITALS
OXYGEN SATURATION: 93 % | RESPIRATION RATE: 16 BRPM | WEIGHT: 133 LBS | HEIGHT: 68 IN | DIASTOLIC BLOOD PRESSURE: 66 MMHG | TEMPERATURE: 98 F | SYSTOLIC BLOOD PRESSURE: 140 MMHG | BODY MASS INDEX: 20.16 KG/M2

## 2018-01-24 DIAGNOSIS — H40.1133 PRIMARY OPEN ANGLE GLAUCOMA OF BOTH EYES, SEVERE STAGE: Primary | ICD-10-CM

## 2018-01-24 DEVICE — EYE IMP VALVE AHMED FLEXIBLE FP7: Type: IMPLANTABLE DEVICE | Site: EYE | Status: FUNCTIONAL

## 2018-01-24 DEVICE — EYE IMP GRAFT VISIONGRAFT CORNEA 1/2MOON 9MM CO301AL-90: Type: IMPLANTABLE DEVICE | Site: EYE | Status: FUNCTIONAL

## 2018-01-24 RX ORDER — MEPERIDINE HYDROCHLORIDE 25 MG/ML
12.5 INJECTION INTRAMUSCULAR; INTRAVENOUS; SUBCUTANEOUS
Status: DISCONTINUED | OUTPATIENT
Start: 2018-01-24 | End: 2018-01-25 | Stop reason: HOSPADM

## 2018-01-24 RX ORDER — NALOXONE HYDROCHLORIDE 0.4 MG/ML
.1-.4 INJECTION, SOLUTION INTRAMUSCULAR; INTRAVENOUS; SUBCUTANEOUS
Status: DISCONTINUED | OUTPATIENT
Start: 2018-01-24 | End: 2018-01-25 | Stop reason: HOSPADM

## 2018-01-24 RX ORDER — SODIUM CHLORIDE, SODIUM LACTATE, POTASSIUM CHLORIDE, CALCIUM CHLORIDE 600; 310; 30; 20 MG/100ML; MG/100ML; MG/100ML; MG/100ML
INJECTION, SOLUTION INTRAVENOUS CONTINUOUS
Status: DISCONTINUED | OUTPATIENT
Start: 2018-01-24 | End: 2018-01-25 | Stop reason: HOSPADM

## 2018-01-24 RX ORDER — DEXAMETHASONE SODIUM PHOSPHATE 4 MG/ML
INJECTION, SOLUTION INTRA-ARTICULAR; INTRALESIONAL; INTRAMUSCULAR; INTRAVENOUS; SOFT TISSUE PRN
Status: DISCONTINUED | OUTPATIENT
Start: 2018-01-24 | End: 2018-01-24

## 2018-01-24 RX ORDER — FENTANYL CITRATE 50 UG/ML
25-50 INJECTION, SOLUTION INTRAMUSCULAR; INTRAVENOUS
Status: DISCONTINUED | OUTPATIENT
Start: 2018-01-24 | End: 2018-01-24 | Stop reason: HOSPADM

## 2018-01-24 RX ORDER — ONDANSETRON 2 MG/ML
4 INJECTION INTRAMUSCULAR; INTRAVENOUS EVERY 30 MIN PRN
Status: DISCONTINUED | OUTPATIENT
Start: 2018-01-24 | End: 2018-01-25 | Stop reason: HOSPADM

## 2018-01-24 RX ORDER — ACETAMINOPHEN 325 MG/1
975 TABLET ORAL ONCE
Status: COMPLETED | OUTPATIENT
Start: 2018-01-24 | End: 2018-01-24

## 2018-01-24 RX ORDER — BALANCED SALT SOLUTION 6.4; .75; .48; .3; 3.9; 1.7 MG/ML; MG/ML; MG/ML; MG/ML; MG/ML; MG/ML
SOLUTION OPHTHALMIC PRN
Status: DISCONTINUED | OUTPATIENT
Start: 2018-01-24 | End: 2018-01-24 | Stop reason: HOSPADM

## 2018-01-24 RX ORDER — SODIUM CHLORIDE, SODIUM LACTATE, POTASSIUM CHLORIDE, CALCIUM CHLORIDE 600; 310; 30; 20 MG/100ML; MG/100ML; MG/100ML; MG/100ML
INJECTION, SOLUTION INTRAVENOUS CONTINUOUS
Status: DISCONTINUED | OUTPATIENT
Start: 2018-01-24 | End: 2018-01-24 | Stop reason: HOSPADM

## 2018-01-24 RX ORDER — FENTANYL CITRATE 50 UG/ML
25-50 INJECTION, SOLUTION INTRAMUSCULAR; INTRAVENOUS
Status: DISCONTINUED | OUTPATIENT
Start: 2018-01-24 | End: 2018-01-25 | Stop reason: HOSPADM

## 2018-01-24 RX ORDER — LABETALOL HYDROCHLORIDE 5 MG/ML
10 INJECTION, SOLUTION INTRAVENOUS
Status: DISCONTINUED | OUTPATIENT
Start: 2018-01-24 | End: 2018-01-24 | Stop reason: HOSPADM

## 2018-01-24 RX ORDER — ONDANSETRON 2 MG/ML
INJECTION INTRAMUSCULAR; INTRAVENOUS PRN
Status: DISCONTINUED | OUTPATIENT
Start: 2018-01-24 | End: 2018-01-24

## 2018-01-24 RX ORDER — PROPOFOL 10 MG/ML
INJECTION, EMULSION INTRAVENOUS PRN
Status: DISCONTINUED | OUTPATIENT
Start: 2018-01-24 | End: 2018-01-24

## 2018-01-24 RX ORDER — DEXAMETHASONE SODIUM PHOSPHATE 4 MG/ML
INJECTION, SOLUTION INTRA-ARTICULAR; INTRALESIONAL; INTRAMUSCULAR; INTRAVENOUS; SOFT TISSUE PRN
Status: DISCONTINUED | OUTPATIENT
Start: 2018-01-24 | End: 2018-01-24 | Stop reason: HOSPADM

## 2018-01-24 RX ORDER — ONDANSETRON 4 MG/1
4 TABLET, ORALLY DISINTEGRATING ORAL EVERY 30 MIN PRN
Status: DISCONTINUED | OUTPATIENT
Start: 2018-01-24 | End: 2018-01-25 | Stop reason: HOSPADM

## 2018-01-24 RX ORDER — FENTANYL CITRATE 50 UG/ML
INJECTION, SOLUTION INTRAMUSCULAR; INTRAVENOUS PRN
Status: DISCONTINUED | OUTPATIENT
Start: 2018-01-24 | End: 2018-01-24

## 2018-01-24 RX ADMIN — PROPOFOL 30 MG: 10 INJECTION, EMULSION INTRAVENOUS at 08:47

## 2018-01-24 RX ADMIN — SODIUM CHLORIDE, SODIUM LACTATE, POTASSIUM CHLORIDE, CALCIUM CHLORIDE: 600; 310; 30; 20 INJECTION, SOLUTION INTRAVENOUS at 08:40

## 2018-01-24 RX ADMIN — Medication 5 ML: at 09:14

## 2018-01-24 RX ADMIN — DEXAMETHASONE SODIUM PHOSPHATE 4 MG: 4 INJECTION, SOLUTION INTRA-ARTICULAR; INTRALESIONAL; INTRAMUSCULAR; INTRAVENOUS; SOFT TISSUE at 08:47

## 2018-01-24 RX ADMIN — DEXAMETHASONE SODIUM PHOSPHATE 4 MG: 4 INJECTION, SOLUTION INTRA-ARTICULAR; INTRALESIONAL; INTRAMUSCULAR; INTRAVENOUS; SOFT TISSUE at 09:35

## 2018-01-24 RX ADMIN — ONDANSETRON 4 MG: 2 INJECTION INTRAMUSCULAR; INTRAVENOUS at 08:45

## 2018-01-24 RX ADMIN — BALANCED SALT SOLUTION 15 ML: 6.4; .75; .48; .3; 3.9; 1.7 SOLUTION OPHTHALMIC at 09:13

## 2018-01-24 RX ADMIN — FENTANYL CITRATE 25 MCG: 50 INJECTION, SOLUTION INTRAMUSCULAR; INTRAVENOUS at 09:03

## 2018-01-24 RX ADMIN — FENTANYL CITRATE 50 MCG: 50 INJECTION, SOLUTION INTRAMUSCULAR; INTRAVENOUS at 08:45

## 2018-01-24 RX ADMIN — ACETAMINOPHEN 975 MG: 325 TABLET ORAL at 07:56

## 2018-01-24 NOTE — IP AVS SNAPSHOT
MRN:[id number]                      After Visit Summary   1/24/2018    Merle Vaughan    MRN: [id number]           Thank you!     Thank you for choosing [facility] for your care. Our goal is always to provide you with excellent care. Hearing back from our patients is one way we can continue to improve our services. Please take a few minutes to complete the written survey that you may receive in the mail after you visit with us. Thank you!        Patient Information     Date Of Birth          1935        About your hospital stay     You were admitted on:  January 24, 2018 [facility]    You were discharged on:  January 24, 2018       Who to Call     For medical emergencies, please call 911.  For non-urgent questions about your medical care, please call your primary care provider or clinic, None  For questions related to your surgery, please call your surgery clinic        Attending Provider     Provider Evelyn Jj MD Ophthalmology       Primary Care Provider Fax #    Physician No Ref-Primary [phone]      Your next 10 appointments already scheduled     Jan 25, 2018 10:30 AM CST   Post-Op with Evelyn Knight MD   Eye Clinic ([facility])    [facility]  [address]   [address]  [facility] [address]   [phone]            Jan 30, 2018  1:00 PM CST   Post-Op with Evelyn Knight MD   Eye Clinic ([facility])    [facility]  [address]  [address]  [facility] [address]   [phone]              Further instructions from your care team       Discharge Instructions after Eye Surgery (Dr. Evelyn Knight & Dr. Ayesha Osman)      Take your post-operative drops according to the instructions    Wear a shield at bedtime    For tube and cataract surgery wear your shield for one week    For trabeculectomy surgery wear your shield for two weeks    Proper  "placement of the shield: place the bump of the shield on the bridge of your nose, resting the shield on the orbital bones. Secure the shield with one piece of tape, from forehead to cheek.      Approved activities (OK to do the following):    Please clean around the eye(s) gently with tissue or washcloth    Leaning over the sink to brush your teeth    Going up and down stairs    Walking for exercise    Watching TV or reading    After your clinic appointment tomorrow, you may shower, including putting your head under the shower, making sure to close your eyes      Restricted activities:     No bending such as \"toe-touching\"    Keep head above level of heart    Sit down to put on shoes    No heavy lifting (10 pound restriction, equal to the weight of a gallon of milk)    Do not push or rub eye      Call for any problems or questions [phone]    There is always someone on call, even on weekends.    [facility]  Home Care Following Anesthesia  For 24 hours after surgery:  1. Get plenty of rest.  A responsible adult must stay with you for at least 24 hours after you leave the surgery center.  2. Do not drive or use heavy equipment.  If you have weakness or tingling, don't drive or use heavy equipment until this feeling goes away.   3. Do not drink alcohol.   4. Avoid strenuous or risky activities.  Ask for help when climbing stairs.  5. You may feel lightheaded.  IF so, sit for a few minutes before standing.  Have someone help you get up.   6. If you have nausea (feel sick to your stomach): Drink only clear liquids such as apple juice, ginger ale, broth or 7-Up.  Rest may also help.  Be sure to drink enough fluids.  Move to a regular diet as you feel able.   7. You may have a slight fever.  Call the doctor if your fever is over 100 F (37.7 C) (taken under the tongue) or lasts longer than 24 hours.  8. You may have a dry mouth, a sore throat, muscle aches or trouble sleeping. " "These should go away after 24 hours.  9. Do not make important or legal decisions.        Today you received a Marcaine or bupivacaine block to numb the nerves near your surgery site.  This is a block using local anesthetic or \"numbing\" medication injected around the nerves to anesthetize or \"numb\" the area supplied by those nerves.  This block is injected into the muscle layer near your surgical site.  The medication may numb the location where you had surgery for 6-18 hours, but may last up to 24 hours.  If your surgical site is an arm or leg you should be careful with your affected limb, since it is possible to injure your limb without being aware of it due to the numbing.  Until full feeling returns, you should guard against bumping or hitting your limb, and avoid extreme hot or cold temperatures on the skin.  As the block wears off, the feeling will return as a tingling or prickly sensation near your surgical site.  You will experience more discomfort from your incision as the feeling returns.  You may want to take a pain pill (a narcotic or Tylenol if this was prescribed by your surgeon) when you start to experience mild pain before the pain beccomes more severe.  If your pain medications do not control your pain you should notifiy your surgeon.    Tips for taking pain medications  To get the best pain relief possible, remember these points:    Take pain medications as directed, before pain becomes severe.    Pain medication can upset your stomach: taking it with food may help.    Constipation is a common side effect of pain medication. Drink plenty of  fluids.    Eat foods high in fiber. Take a stool softener if recommended by your doctor or pharmacist.    Do not drink alcohol, drive or operate machinery while taking pain medications.    Ask about other ways to control pain, such as with heat, ice or relaxation.    Tylenol/Acetaminophen Consumption  To help encourage the safe use of acetaminophen, the makers " "of TYLENOL  have lowered the maximum daily dose for single-ingredient Extra Strength TYLENOL  (acetaminophen) products sold in the [place] from 8 pills per day (4,000 mg) to 6 pills per day (3,000 mg). The dosing interval has also changed from 2 pills every 4-6 hours to 2 pills every 6 hours.    If you feel your pain relief is insufficient, you may take Tylenol/Acetaminophen in addition to your narcotic pain medication.     Be careful not to exceed 3,000 mg of Tylenol/Acetaminophen in a 24 hour period from all sources.    If you are taking extra strength Tylenol/acetaminophen (500 mg), the maximum dose is 6 tablets in 24 hours.    If you are taking regular strength acetaminophen (325 mg), the maximum dose is 9 tablets in 24 hours.    Call a doctor for any of the followin. Signs of infection (fever, growing tenderness at the surgery site, a large amount of drainage or bleeding, severe pain, foul-smelling drainage, redness, swelling).  2. It has been over 8 to 10 hours since surgery and you are still not able to urinate (pass water).  3. Headache for over 24 hours.  4. Numbness, tingling or weakness the day after surgery (if you had spinal anesthesia).  Your doctor is:       Dr. Knight, Ophthalmology: [phone]               Or dial [phone] and ask for the resident on call for:  Ophthalmology  For emergency care, call the:  [facility]:  [phone] (TTY for hearing impaired: [phone])                Pending Results     No orders found from 2018 to 2018.            Admission Information     Date & Time Provider Department Dept. Phone    2018 Evelyn Knight MD [facility] [phone]      Your Vitals Were     Blood Pressure Temperature Respirations Height Weight Pulse Oximetry    151/78 97.8  F (36.6  C) (Oral) 16 1.727 m (5' 8\") 60.3 kg (133 lb) 97%    BMI (Body Mass Index)                   20.22 kg/m2           MyChart Information     " [employer] is an electronic gateway that provides easy, online access to your medical records. With [employer], you can request a clinic appointment, read your test results, renew a prescription or communicate with your care team.     To sign up for [employer] visit the website at [email or web address]   You will be asked to enter the access code listed below, as well as some personal information. Please follow the directions to create your username and password.     Your access code is: [email or web address]  Expires: 2018  6:30 AM     Your access code will  in 90 days. If you need help or a new code, please contact your [facility] or call [phone] for assistance.        Care EveryWhere ID     This is your Care EveryWhere ID. This could be used by other organizations to access your [facility] medical records  [id number]        Equal Access to Services     ALBINO CALLAWAY : David Greco, mik hernandez, rosy card, ernie rodriguez . So [facility] [phone].    ATENCIÓN: Si habla español, tiene a solis disposición servicios gratuitos de asistencia lingüística. Llame al [phone].    We comply with applicable federal civil rights laws and [place] laws. We do not discriminate on the basis of race, color, national origin, age, disability, sex, sexual orientation, or gender identity.               Review of your medicines      UNREVIEWED medicines. Ask your doctor about these medicines        Dose / Directions    acetaminophen 325 MG tablet   Commonly known as:  TYLENOL        Dose:  650 mg   Take 650 mg by mouth   Refills:  0       ALPHAGAN OP        Dose:  1 drop   Place 1 drop into the right eye 2 times daily   Refills:  0       Calcium Carbonate 1250 (500 CA) MG Caps        Dose:  15 mL   Take 15 mLs by mouth Maalox oral   Refills:  0       carboxymethylcellulose 0.5 % Soln ophthalmic solution   Commonly known as:  REFRESH PLUS         One drop in each eye 4 times daily as needed   Refills:  0       clonazePAM 0.5 MG tablet   Commonly known as:  klonoPIN        Dose:  0.5 mg   Take 0.5 mg by mouth   Refills:  0       dorzolamide-timolol 2-0.5 % ophthalmic solution   Commonly known as:  COSOPT        Place 1 drop into BOTH eyes twice daily.   Refills:  0       FLUOROMETHOLONE OP        Dose:  1 drop   Apply 1 drop to eye daily Right eye, per med sheet with pt   Refills:  0       latanoprost 0.005 % ophthalmic solution   Commonly known as:  XALATAN   Used for:  Primary open angle glaucoma of both eyes, severe stage        Dose:  1 drop   Place 1 drop into the right eye At Bedtime   Refills:  0       melatonin 1 MG Caps        Dose:  5 mg   Take 5 mg by mouth   Refills:  0       mirtazapine 7.5 MG Tabs tablet   Commonly known as:  REMERON        Dose:  30 mg   Take 30 mg by mouth   Refills:  0       moxifloxacin 0.5 % ophthalmic solution   Commonly known as:  VIGAMOX        Dose:  1 drop   Place 1 drop into the right eye 6 times daily   Refills:  0       prednisoLONE acetate 1 % ophthalmic susp   Commonly known as:  PRED FORTE        Dose:  1 drop   Place 1 drop into the right eye 3 times daily   Refills:  0       SIMVASTATIN PO        Dose:  20 mg   Take 20 mg by mouth daily   Refills:  0                Protect others around you: Learn how to safely use, store and throw away your medicines at www.disposemymeds.org.             Medication List: This is a list of all your medications and when to take them. Check marks below indicate your daily home schedule. Keep this list as a reference.      Medications           Morning Afternoon Evening Bedtime As Needed    acetaminophen 325 MG tablet   Commonly known as:  TYLENOL   Take 650 mg by mouth   Last time this was given:  975 mg on 1/24/2018  7:56 AM                                ALPHAGAN OP   Place 1 drop into the right eye 2 times daily                                Calcium Carbonate 1250 (500  CA) MG Caps   Take 15 mLs by mouth Maalox oral                                carboxymethylcellulose 0.5 % Soln ophthalmic solution   Commonly known as:  REFRESH PLUS   One drop in each eye 4 times daily as needed                                clonazePAM 0.5 MG tablet   Commonly known as:  klonoPIN   Take 0.5 mg by mouth                                dorzolamide-timolol 2-0.5 % ophthalmic solution   Commonly known as:  COSOPT   Place 1 drop into BOTH eyes twice daily.                                FLUOROMETHOLONE OP   Apply 1 drop to eye daily Right eye, per med sheet with pt                                latanoprost 0.005 % ophthalmic solution   Commonly known as:  XALATAN   Place 1 drop into the right eye At Bedtime                                melatonin 1 MG Caps   Take 5 mg by mouth                                mirtazapine 7.5 MG Tabs tablet   Commonly known as:  REMERON   Take 30 mg by mouth                                moxifloxacin 0.5 % ophthalmic solution   Commonly known as:  VIGAMOX   Place 1 drop into the right eye 6 times daily                                prednisoLONE acetate 1 % ophthalmic susp   Commonly known as:  PRED FORTE   Place 1 drop into the right eye 3 times daily                                SIMVASTATIN PO   Take 20 mg by mouth daily

## 2018-01-24 NOTE — ANESTHESIA CARE TRANSFER NOTE
Patient: Merle Vaughan    Procedure(s):  Ahmed Tube Insertion, Right - Wound Class: I-Clean    Diagnosis: Right Eye Primary Glaucoma  Diagnosis Additional Information: No value filed.    Anesthesia Type:   MAC     Note:  Airway :Room Air  Patient transferred to:Phase II  Comments: Uneventful transport to Phase II: VSS; IV patent; pt comfortable; Report given to RN; pt. Responds appropriately to commandsHandoff Report: Identifed the Patient, Identified the Reponsible Provider, Reviewed the pertinent medical history, Discussed the surgical course, Reviewed Intra-OP anesthesia mangement and issues during anesthesia, Set expectations for post-procedure period and Allowed opportunity for questions and acknowledgement of understanding      Vitals: (Last set prior to Anesthesia Care Transfer)    CRNA VITALS  1/24/2018 0928 - 1/24/2018 0958      1/24/2018             Ht Rate: 74    Resp Rate (set): 10                Electronically Signed By: MARKO Cerda CRNA  January 24, 2018  9:58 AM

## 2018-01-24 NOTE — ANESTHESIA POSTPROCEDURE EVALUATION
Patient: Merle Vaughan    Procedure(s):  Ahmed Tube Insertion, Right - Wound Class: I-Clean    Diagnosis:Right Eye Primary Glaucoma  Diagnosis Additional Information: No value filed.    Anesthesia Type:  MAC    Note:  Anesthesia Post Evaluation    Patient location during evaluation: PACU  Patient participation: Able to fully participate in evaluation  Level of consciousness: awake  Pain management: adequate  Airway patency: patent  Cardiovascular status: acceptable  Respiratory status: acceptable  Hydration status: acceptable  PONV: none             Last vitals:  Vitals:    01/24/18 0723 01/24/18 1005 01/24/18 1026   BP: 151/78 (!) 158/95 140/66   Resp: 16 14 16   Temp: 36.6  C (97.8  F) 36.7  C (98  F) 36.7  C (98  F)   SpO2: 97% 94% 93%         Electronically Signed By: Marcel Ortega MD  January 24, 2018  1:15 PM

## 2018-01-24 NOTE — DISCHARGE INSTRUCTIONS
"Discharge Instructions after Eye Surgery (Dr. Evelyn Knight & Dr. Ayesha Osman)      Take your post-operative drops according to the instructions    Wear a shield at bedtime    For tube and cataract surgery wear your shield for one week    For trabeculectomy surgery wear your shield for two weeks    Proper placement of the shield: place the bump of the shield on the bridge of your nose, resting the shield on the orbital bones. Secure the shield with one piece of tape, from forehead to cheek.      Approved activities (OK to do the following):    Please clean around the eye(s) gently with tissue or washcloth    Leaning over the sink to brush your teeth    Going up and down stairs    Walking for exercise    Watching TV or reading    After your clinic appointment tomorrow, you may shower, including putting your head under the shower, making sure to close your eyes      Restricted activities:     No bending such as \"toe-touching\"    Keep head above level of heart    Sit down to put on shoes    No heavy lifting (10 pound restriction, equal to the weight of a gallon of milk)    Do not push or rub eye      Call for any problems or questions [phone]    There is always someone on call, even on weekends.    [facility]  Home Care Following Anesthesia  For 24 hours after surgery:  1. Get plenty of rest.  A responsible adult must stay with you for at least 24 hours after you leave the surgery center.  2. Do not drive or use heavy equipment.  If you have weakness or tingling, don't drive or use heavy equipment until this feeling goes away.   3. Do not drink alcohol.   4. Avoid strenuous or risky activities.  Ask for help when climbing stairs.  5. You may feel lightheaded.  IF so, sit for a few minutes before standing.  Have someone help you get up.   6. If you have nausea (feel sick to your stomach): Drink only clear liquids such as apple juice, ginger ale, broth or 7-Up.  Rest may also " "help.  Be sure to drink enough fluids.  Move to a regular diet as you feel able.   7. You may have a slight fever.  Call the doctor if your fever is over 100 F (37.7 C) (taken under the tongue) or lasts longer than 24 hours.  8. You may have a dry mouth, a sore throat, muscle aches or trouble sleeping. These should go away after 24 hours.  9. Do not make important or legal decisions.        Today you received a Marcaine or bupivacaine block to numb the nerves near your surgery site.  This is a block using local anesthetic or \"numbing\" medication injected around the nerves to anesthetize or \"numb\" the area supplied by those nerves.  This block is injected into the muscle layer near your surgical site.  The medication may numb the location where you had surgery for 6-18 hours, but may last up to 24 hours.  If your surgical site is an arm or leg you should be careful with your affected limb, since it is possible to injure your limb without being aware of it due to the numbing.  Until full feeling returns, you should guard against bumping or hitting your limb, and avoid extreme hot or cold temperatures on the skin.  As the block wears off, the feeling will return as a tingling or prickly sensation near your surgical site.  You will experience more discomfort from your incision as the feeling returns.  You may want to take a pain pill (a narcotic or Tylenol if this was prescribed by your surgeon) when you start to experience mild pain before the pain beccomes more severe.  If your pain medications do not control your pain you should notifiy your surgeon.    Tips for taking pain medications  To get the best pain relief possible, remember these points:    Take pain medications as directed, before pain becomes severe.    Pain medication can upset your stomach: taking it with food may help.    Constipation is a common side effect of pain medication. Drink plenty of  fluids.    Eat foods high in fiber. Take a stool softener " if recommended by your doctor or pharmacist.    Do not drink alcohol, drive or operate machinery while taking pain medications.    Ask about other ways to control pain, such as with heat, ice or relaxation.    Tylenol/Acetaminophen Consumption  To help encourage the safe use of acetaminophen, the makers of TYLENOL  have lowered the maximum daily dose for single-ingredient Extra Strength TYLENOL  (acetaminophen) products sold in the [place] from 8 pills per day (4,000 mg) to 6 pills per day (3,000 mg). The dosing interval has also changed from 2 pills every 4-6 hours to 2 pills every 6 hours.    If you feel your pain relief is insufficient, you may take Tylenol/Acetaminophen in addition to your narcotic pain medication.     Be careful not to exceed 3,000 mg of Tylenol/Acetaminophen in a 24 hour period from all sources.    If you are taking extra strength Tylenol/acetaminophen (500 mg), the maximum dose is 6 tablets in 24 hours.    If you are taking regular strength acetaminophen (325 mg), the maximum dose is 9 tablets in 24 hours.    Call a doctor for any of the followin. Signs of infection (fever, growing tenderness at the surgery site, a large amount of drainage or bleeding, severe pain, foul-smelling drainage, redness, swelling).  2. It has been over 8 to 10 hours since surgery and you are still not able to urinate (pass water).  3. Headache for over 24 hours.  4. Numbness, tingling or weakness the day after surgery (if you had spinal anesthesia).  Your doctor is:       Dr. Knight, Ophthalmology: [phone]               Or dial [phone] and ask for the resident on call for:  Ophthalmology  For emergency care, call the:  [facility]:  [phone] (TTY for hearing impaired: [phone])

## 2018-01-24 NOTE — IP AVS SNAPSHOT
[facility]    [address] [place] [id number]    Phone:  [phone]    Fax:  [phone]                                       After Visit Summary   1/24/2018    Merle Vaughan    MRN: [id number]           After Visit Summary Signature Page     I have received my discharge instructions, and my questions have been answered. I have discussed any challenges I see with this plan with the nurse or doctor.    ..........................................................................................................................................  Patient/Patient Representative Signature      ..........................................................................................................................................  Patient Representative Print Name and Relationship to Patient    ..................................................               ................................................  Date                                            Time    ..........................................................................................................................................  Reviewed by Signature/Title    ...................................................              ..............................................  Date                                                            Time

## 2018-01-24 NOTE — OP NOTE
Pre-operative diagnosis: Right Eye Glaucoma   Post-operative diagnosis Same   Procedure: Procedure(s):  Ahmed Tube Insertion, Right - Wound Class: I-Clean   Surgeon: Evelyn Knight MD   Assistants(s): None   Estimated blood loss: Minimal    Specimens: None   Findings: None       Indication: Patient was noted to have glaucoma with uncontrolled intraocular pressures despite maximally tolerated medical therapy.     After informed consent was obtained, the patient was wheeled to the operative suite. Preoperatively the patient received a retrobulbar block consisting of a 1:1 mixture of 2% Lidocaine and 0.75% Bupivicaine under propofol anesthesia. Patient was then prepped and draped in the usual sterile fashion. A lid speculum was placed between the right upper and lower eyelids. A 0.12 forceps and Vannas scissors were used to create a conjunctival peritomy extending from the 9 o'clock to 12 o'clock locations. A Елена scissors and nontoothed forceps were then used to dissect free the tenons attachments.  Two relaxing conjunctival incisions were created at the 9 o'clock and 12 o'clock locations using the Елена scissors and non-toothed forceps. Hemostasis was maintained with wet-field electrocautery. A andrew's tenotomy scissors and non-toothed forceps were used to enlarge the pocket in the superotemporal quadrant. Next, an Ahmed valve implant, model FP7, was obtained. A tube inserting forceps and BSS on a 27 gauge cannula were then used to prime the implant and it was found to be in good working order. A caliper was used to measure a location 8mm posterior to the limbus in the superotemporal quadrant. Two simple 8-0 nylon interrupted sutures were used to adhere the plate to the episclera in the superotemporal quadrant 8mm posterior to the limbus. The tube was once again checked using a tube inserting forceps and BSS on a 27 gauge cannula and it was found to be in good working order. A 0.12 forceps and 1.0mm  sideport blade were then used to make a paracentesis inferotemporally through the clear cornea. Viscoat was injected into the anterior chamber.  A tube inserting forceps and Vannas scissors were then used to cut the tube to the appropriate length with a bevel up configuration. A 23 gauge needle and 0.12 forceps were then used to make a tunnel into the anterior chamber from approximately 1-2mm posterior to the limbus. The tube inserting forceps and 0.12 forceps were then used to insert the tube into the anterior chamber. The tube was noted to lie in good position just anterior the iris plane. A 10-0 nylon suture was then used to adhere the tube to the episclera. Visiongraft cornea patch graft was then adhered to the episclera using two simple 10-0 nylon interrupted sutures at the leading edges of the graft. The graft was noted to cover the tube well. The conjunctiva was reapproximated to the limbus using 2 simple 10-0 nylon interrupted sutures. The conjunctiva was reapproximated back to itself in the superior and temporal quadrants using a 7-0 vicryl running suture. At the conclusion of the case the patient received subconjunctival injections of dexamethasone and cefazolin. The patient then had Tobradex ophthalmic ointment applied to the ocular surface of the right eye as well as a pressure patch and linares shield. The patient was wheeled to the recovery area in stable condition.

## 2018-01-24 NOTE — ANESTHESIA PREPROCEDURE EVALUATION
Anesthesia Evaluation     . Pt has had prior anesthetic. Type: General and MAC           ROS/MED HX    ENT/Pulmonary:  - neg pulmonary ROS     Neurologic:  - neg neurologic ROS     Cardiovascular:  - neg cardiovascular ROS       METS/Exercise Tolerance:  3 - Able to walk 1-2 blocks without stopping   Hematologic:         Musculoskeletal:  - neg musculoskeletal ROS       GI/Hepatic:     (+) GERD       Renal/Genitourinary:  - ROS Renal section negative       Endo:         Psychiatric:  - neg psychiatric ROS       Infectious Disease:  - neg infectious disease ROS       Malignancy:      - no malignancy   Other:                     Physical Exam  Normal systems: pulmonary    Airway   Mallampati: III    Dental   (+) lower dentures    Cardiovascular   Rhythm and rate: regular and normal      Pulmonary                     Anesthesia Plan      History & Physical Review  History and physical reviewed and following examination; no interval change.    ASA Status:  2 .    NPO Status:  > 8 hours    Plan for MAC with Intravenous induction. Maintenance will be TIVA.  Reason for MAC:  Deep or markedly invasive procedure (G8)         Postoperative Care  Postoperative pain management:  IV analgesics.      Consents  Anesthetic plan, risks, benefits and alternatives discussed with:  Patient..                          .

## 2018-01-25 ENCOUNTER — OFFICE VISIT (OUTPATIENT)
Dept: OPHTHALMOLOGY | Facility: CLINIC | Age: 83
End: 2018-01-25
Attending: OPHTHALMOLOGY
Payer: COMMERCIAL

## 2018-01-25 ENCOUNTER — TELEPHONE (OUTPATIENT)
Dept: OPHTHALMOLOGY | Facility: CLINIC | Age: 83
End: 2018-01-25

## 2018-01-25 DIAGNOSIS — H40.1133 PRIMARY OPEN ANGLE GLAUCOMA OF BOTH EYES, SEVERE STAGE: Primary | ICD-10-CM

## 2018-01-25 PROCEDURE — G0463 HOSPITAL OUTPT CLINIC VISIT: HCPCS | Mod: ZF

## 2018-01-25 ASSESSMENT — EXTERNAL EXAM - RIGHT EYE: OD_EXAM: NORMAL

## 2018-01-25 ASSESSMENT — SLIT LAMP EXAM - LIDS
COMMENTS: BLEPHARITIS, MGD
COMMENTS: BLEPHARITIS, MGD

## 2018-01-25 ASSESSMENT — VISUAL ACUITY
OS_SC+: +/-
OS_SC: 20/40
OD_PH_SC: 20/150
OD_SC: 20/300
METHOD: SNELLEN - LINEAR

## 2018-01-25 ASSESSMENT — TONOMETRY
IOP_METHOD: APPLANATION
OD_IOP_MMHG: 06
OS_IOP_MMHG: 18

## 2018-01-25 ASSESSMENT — EXTERNAL EXAM - LEFT EYE: OS_EXAM: NORMAL

## 2018-01-25 NOTE — NURSING NOTE
Chief Complaints and History of Present Illnesses   Patient presents with     Follow Up For     Tube shunt RE 01/24/2018     HPI    Affected eye(s):  Right   Symptoms:     Blurred vision   Decreased vision         Do you have eye pain now?:  No      Comments:  Pt states she's had intermittent pain for the last week. Has been better today.  Anabel GERMAIN 11:01 AM January 25, 2018

## 2018-01-25 NOTE — PROGRESS NOTES
1)POAG -- s/p Tube OD (1/24/18), s/p Ahmed Tube OS (4/11/16),  first dx in 1960, ?s/p Trab in 1980s, progression OD in 2018 when pt lost to f/u  -- K pachy: 548/522 Tmax: 30s HVF: OD:Central island in 2018 (prog when pt lost to f/u) and Superior arcuate and Inf NS in 0147-0442 and OS:Dense superior and Inferior arcaute, [place] island CDR:OD:0.9 (prog noted in 2017 after being lost to f/u) and OS:0.85 HRT/OCT:Mod-Severe RNFl thinning  FHX of Glc: None Gonio:OD: and OS:open Intolerant to: Asthma/COPD:No Steroid Use: No Kidney Stones: No Sulfa Allergy: Yes, ?sick IOP targets: -- IOP improved  2)PCIOL OU -- [place] [facility] [place] (gurjit) lives in [place]  3)H/O Hypotony with choroidals OD -- resolved with new recurrence (8/16) ?2/2 eye rubbing -- needs to wear shield OD qHS at all times  4)KITA/Bleph/Allergic Keratoconjunctivitis -- has seen Dr. 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 Diaz -- was stable on FML BID    MD: [facility] administering gtts     MD:pt lost to f/u from 6/17 to 12/17  -- etiology of progression OD -- pt wants me to talk to Gurjit [phone] -- consent obtained from him prior to surgery    Patient will continue on Cosopt (Timolol/Dorzolamide) which is a blue top drop 2x/day (12 hours apart) in the LEFT EYE ONLY.    Patient will discontinue Lumigan which is a teal top drop, FML and Alphagan (Brimonidine) which is a purple top drop.     Patient will continue using an eye shield over the right eye while sleeping and refrain from rubbing eyes.        Patient will also continue on artificial tears 4-6x/day in both eyes.      No heavy lifting, bending, stooping, straining, rubbing the eye, or getting water in the eye.  Please wear protective eyewear over the eye at all times including glasses during the day and the protective shield at bedtime.  Patient will  return to clinic in 1 week with repeat evaluation.    Use the following drops (RIGHT EYE ONLY DROPS):  Antibiotic --  Ciprofloxacin: 4x/day until finished (use for at least 5-7 days after surgery)    Steroid -- Pred Forte/Prednisolone Acetate: every 2 hours while awake    Please be sure to call if you have any decrease in vision, increase in pain, flashing lights, redness, or a lot of drainage.        Attending Physician Attestation:  Complete documentation of historical and exam elements from today's encounter can be found in the full encounter summary report (not reduplicated in this progress note). I personally obtained the chief complaint(s) and history of present illness.  I confirmed and edited as necessary the review of systems, past medical/surgical history, family history, social history, and examination findings as documented by others; and I examined the patient myself. I personally reviewed the relevant tests, images, and reports as documented above. I formulated and edited as necessary the assessment and plan and discussed the findings and management plan with the patient and family.  - Evelyn Knight MD

## 2018-01-25 NOTE — TELEPHONE ENCOUNTER
----- Message from Esme Hart sent at 1/25/2018  3:15 PM CST -----  Regarding: Romelia from Pt's facility calling to get clarification on artificial tears instructions...  Contact: [phone]  instructions indicate 4-6x/day. Their facility can give 4 or 5, but not the 4-6 range.     Please call Romelia at [phone].    Thank you,  Maikel S    Please DO NOT send this message and/or reply back to sender.  Call Center Representatives DO NOT respond to messages.

## 2018-01-25 NOTE — PATIENT INSTRUCTIONS
Patient will continue on Cosopt (Timolol/Dorzolamide) which is a blue top drop 2x/day (12 hours apart) in the LEFT EYE ONLY.    Patient will discontinue Lumigan which is a teal top drop, FML and Alphagan (Brimonidine) which is a purple top drop.     Patient will continue using an eye shield over the right eye while sleeping and refrain from rubbing eyes.        Patient will also continue on artificial tears 4-6x/day in both eyes.      No heavy lifting, bending, stooping, straining, rubbing the eye, or getting water in the eye.  Please wear protective eyewear over the eye at all times including glasses during the day and the protective shield at bedtime.  Patient will return to clinic in 1 week with repeat evaluation.    Use the following drops (RIGHT EYE ONLY DROPS):  Antibiotic --  Ciprofloxacin: 4x/day until finished (use for at least 5-7 days after surgery)    Steroid -- Pred Forte/Prednisolone Acetate: every 2 hours while awake    Please be sure to call if you have any decrease in vision, increase in pain, flashing lights, redness, or a lot of drainage.

## 2018-01-25 NOTE — TELEPHONE ENCOUNTER
Care facility needs clarification  Cannot take range for times per day as orders    Verbal order to change current order for 4-6 times/day to 4 times a day and PRN   Care facility verbally demonstrated understanding  Satnam Benavidez RN 3:24 PM 01/25/18

## 2018-01-25 NOTE — MR AVS SNAPSHOT
After Visit Summary   1/25/2018    Merle Vaughan    MRN: [id number]           Patient Information     Date Of Birth          1935        Visit Information        Provider Department      1/25/2018 10:30 AM Evelyn Knight MD [facility]        Today's Diagnoses     Primary open angle glaucoma of both eyes, severe stage    -  1      Care Instructions    Patient will continue on Cosopt (Timolol/Dorzolamide) which is a blue top drop 2x/day (12 hours apart) in the LEFT EYE ONLY.    Patient will discontinue Lumigan which is a teal top drop, FML and Alphagan (Brimonidine) which is a purple top drop.     Patient will continue using an eye shield over the right eye while sleeping and refrain from rubbing eyes.        Patient will also continue on artificial tears 4-6x/day in both eyes.      No heavy lifting, bending, stooping, straining, rubbing the eye, or getting water in the eye.  Please wear protective eyewear over the eye at all times including glasses during the day and the protective shield at bedtime.  Patient will return to clinic in 1 week with repeat evaluation.    Use the following drops (RIGHT EYE ONLY DROPS):  Antibiotic --  Ciprofloxacin: 4x/day until finished (use for at least 5-7 days after surgery)    Steroid -- Pred Forte/Prednisolone Acetate: every 2 hours while awake    Please be sure to call if you have any decrease in vision, increase in pain, flashing lights, redness, or a lot of drainage.            Follow-ups after your visit        Follow-up notes from your care team     Return in about 1 week (around 2/1/2018).      Your next 10 appointments already scheduled     Jan 30, 2018  1:00 PM CST   Post-Op with Evelyn Knight MD   Eye Clinic ([facility])    [address]  [address]  [address]  [facility] [id number] [phone]              Who to contact     Please call your clinic at [phone] to:    Ask questions about your  health    Make or cancel appointments    Discuss your medicines    Learn about your test results    Speak to your doctor   If you have compliments or concerns about an experience at your clinic, or if you wish to file a complaint, please contact [facility] Patient Relations at [phone] or email us at [email or web address]         Additional Information About Your Visit        [employer] Information     [employer] is an electronic gateway that provides easy, online access to your medical records. With [employer], you can request a clinic appointment, read your test results, renew a prescription or communicate with your care team.     To sign up for [employer] visit the website at [email or web address]   You will be asked to enter the access code listed below, as well as some personal information. Please follow the directions to create your username and password.     Your access code is: [email or web address]  Expires: 2018  6:30 AM     Your access code will  in 90 days. If you need help or a new code, please contact your [facility] or call [phone] for assistance.        Care EveryWhere ID     This is your Care EveryWhere ID. This could be used by other organizations to access your [place] medical records  [id number]         Blood Pressure from Last 3 Encounters:   18 140/66    Weight from Last 3 Encounters:   18 60.3 kg (133 lb)              Today, you had the following     No orders found for display       Primary Care Provider Fax #    Physician No Ref-Primary [phone]       No address on file        Equal Access to Services     ALBINO CALLAWAY : Hadii aad ku hadasho Soomaali, waaxda luqadaha, qaybta kaalmada adeegyada, ernie rodriguez . So [facility] [phone].    ATENCIÓN: Si habla español, tiene a solis disposición servicios gratuitos de asistencia lingüística. Llame al [phone].    We comply with  applicable federal civil rights laws and [place] laws. We do not discriminate on the basis of race, color, national origin, age, disability, sex, sexual orientation, or gender identity.            Thank you!     Thank you for choosing EYE CLINIC  for your care. Our goal is always to provide you with excellent care. Hearing back from our patients is one way we can continue to improve our services. Please take a few minutes to complete the written survey that you may receive in the mail after your visit with us. Thank you!             Your Updated Medication List - Protect others around you: Learn how to safely use, store and throw away your medicines at www.disposemymeds.org.          This list is accurate as of 1/25/18  1:20 PM.  Always use your most recent med list.                   Brand Name Dispense Instructions for use Diagnosis    acetaminophen 325 MG tablet    TYLENOL     Take 650 mg by mouth        ALPHAGAN OP      Place 1 drop into the right eye 2 times daily        carboxymethylcellulose 0.5 % Soln ophthalmic solution    REFRESH PLUS     One drop in each eye 4 times daily as needed        clonazePAM 0.5 MG tablet    klonoPIN     Take 0.5 mg by mouth        dorzolamide-timolol 2-0.5 % ophthalmic solution    COSOPT     Place 1 drop into BOTH eyes twice daily.        FLUOROMETHOLONE OP      Apply 1 drop to eye daily Right eye, per med sheet with pt        melatonin 1 MG Caps      Take 5 mg by mouth        mirtazapine 7.5 MG Tabs tablet    REMERON     Take 30 mg by mouth        SIMVASTATIN PO      Take 20 mg by mouth daily

## 2018-01-30 ENCOUNTER — OFFICE VISIT (OUTPATIENT)
Dept: OPHTHALMOLOGY | Facility: CLINIC | Age: 83
End: 2018-01-30
Attending: OPHTHALMOLOGY
Payer: COMMERCIAL

## 2018-01-30 DIAGNOSIS — Z96.1 PSEUDOPHAKIA OF BOTH EYES: ICD-10-CM

## 2018-01-30 DIAGNOSIS — H40.1133 PRIMARY OPEN ANGLE GLAUCOMA OF BOTH EYES, SEVERE STAGE: Primary | ICD-10-CM

## 2018-01-30 PROCEDURE — G0463 HOSPITAL OUTPT CLINIC VISIT: HCPCS | Mod: ZF

## 2018-01-30 RX ORDER — DOXYCYCLINE 100 MG/1
100 CAPSULE ORAL DAILY
COMMUNITY
Start: 2018-01-18

## 2018-01-30 RX ORDER — BETAMETHASONE DIPROPIONATE 0.05 %
OINTMENT (GRAM) TOPICAL
COMMUNITY
Start: 2018-01-22

## 2018-01-30 ASSESSMENT — EXTERNAL EXAM - LEFT EYE: OS_EXAM: NORMAL

## 2018-01-30 ASSESSMENT — VISUAL ACUITY
OS_PH_SC: 20/30
OD_PH_SC: 20/100-1
METHOD: SNELLEN - LINEAR
OS_SC+: -2
OD_SC+: -1
OS_SC: 20/40
OD_SC: 20/125

## 2018-01-30 ASSESSMENT — TONOMETRY
IOP_METHOD: APPLANATION
OS_IOP_MMHG: 9
OD_IOP_MMHG: 4

## 2018-01-30 ASSESSMENT — EXTERNAL EXAM - RIGHT EYE: OD_EXAM: NORMAL

## 2018-01-30 ASSESSMENT — SLIT LAMP EXAM - LIDS
COMMENTS: BLEPHARITIS, MGD
COMMENTS: BLEPHARITIS, MGD

## 2018-01-30 NOTE — PROGRESS NOTES
1)POAG -- s/p Tube OD (1/24/18), s/p Ahmed Tube OS (4/11/16),  first dx in 1960, ?s/p Trab in 1980s, progression OD in 2018 when pt lost to f/u  -- K pachy: 548/522 Tmax: 30s HVF: OD:Central island in 2018 (prog when pt lost to f/u) and Superior arcuate and Inf NS in 3930-7977 and OS:Dense superior and Inferior arcaute, central island CDR:OD:0.9 (prog noted in 2017 after being lost to f/u) and OS:0.85 HRT/OCT:Mod-Severe RNFl thinning  FHX of Glc: None Gonio:OD: and OS:open Intolerant to: Asthma/COPD:No Steroid Use: No Kidney Stones: No Sulfa Allergy: Yes, ?sick IOP targets: -- IOP improved  2)PCIOL OU -- was previously following at [facility] [place], son (gurjit) lives in [place]  3)H/O Hypotony with choroidals OD -- resolved with new recurrence (8/16) ?2/2 eye rubbing -- needs to wear shield OD qHS at all times  4)KITA/Bleph/Allergic Keratoconjunctivitis -- has seen Dr. 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 Diaz -- was stable on FML BID    MD: [facility] administering gtts     MD:pt lost to f/u from 6/17 to 12/17  -- etiology of progression OD -- pt wants me to talk to Gurjit [phone] -- consent obtained from him prior to surgery      MD: (1/20/18) -- pt receiving wrong drops per MAR -- will call facility to clarify -- pt reportedly receiving correct drops after phone call (MAR is wrong)      Patient will continue on Cosopt (Timolol/Dorzolamide) which is a blue top drop 2x/day (12 hours apart) in the LEFT EYE ONLY.    Patient will discontinue Lumigan which is a teal top drop, FML and Alphagan (Brimonidine) which is a purple top drop.     Patient will continue using an eye shield over the right eye while sleeping and refrain from rubbing eyes.        Patient will also continue on artificial tears 4-6x/day in both eyes.      No heavy lifting, bending, stooping, straining, rubbing the eye, or getting  water in the eye.  Please wear protective eyewear over the eye at all times including glasses during the day and the protective shield at bedtime.  Patient will return to clinic in 1-2 weeks with repeat dilated eye exam (right eye only).    Use the following drops (RIGHT EYE ONLY DROPS):  Antibiotic --  Ciprofloxacin:Discontinue     Steroid -- Pred Forte/Prednisolone Acetate: every 2 hours while awake for 1 week then 4x/day until seen    Please be sure to call if you have any decrease in vision, increase in pain, flashing lights, redness, or a lot of drainage.        Attending Physician Attestation:  Complete documentation of historical and exam elements from today's encounter can be found in the full encounter summary report (not reduplicated in this progress note). I personally obtained the chief complaint(s) and history of present illness.  I confirmed and edited as necessary the review of systems, past medical/surgical history, family history, social history, and examination findings as documented by others; and I examined the patient myself. I personally reviewed the relevant tests, images, and reports as documented above. I formulated and edited as necessary the assessment and plan and discussed the findings and management plan with the patient and family.  - Evelyn Knight MD

## 2018-01-30 NOTE — NURSING NOTE
Chief Complaints and History of Present Illnesses   Patient presents with     Post Op (Ophthalmology) Right Eye      1 day after Ahmed valve placed     HPI    Last Eye Exam:  1/25/18   Affected eye(s):  Right   Symptoms:        Duration:  1 day   Frequency:  Constant       Do you have eye pain now?:  No      Comments:  Merle is here today 1 day post op after Ahmed valve placed  RE  She says her RE is throbbing and scratchy, and does not see very well.      Arnulfo Reeves COT 1:29 PM January 30, 2018

## 2018-01-30 NOTE — PATIENT INSTRUCTIONS
Patient will continue on Cosopt (Timolol/Dorzolamide) which is a blue top drop 2x/day (12 hours apart) in the LEFT EYE ONLY.    Patient will discontinue Lumigan which is a teal top drop, FML and Alphagan (Brimonidine) which is a purple top drop.     Patient will continue using an eye shield over the right eye while sleeping and refrain from rubbing eyes.        Patient will also continue on artificial tears 4-6x/day in both eyes.      No heavy lifting, bending, stooping, straining, rubbing the eye, or getting water in the eye.  Please wear protective eyewear over the eye at all times including glasses during the day and the protective shield at bedtime.  Patient will return to clinic in 1-2 weeks with repeat dilated eye exam (right eye only).    Use the following drops (RIGHT EYE ONLY DROPS):  Antibiotic --  Ciprofloxacin:Discontinue     Steroid -- Pred Forte/Prednisolone Acetate: every 2 hours while awake for 1 week then 4x/day until seen    Please be sure to call if you have any decrease in vision, increase in pain, flashing lights, redness, or a lot of drainage.

## 2018-01-30 NOTE — MR AVS SNAPSHOT
After Visit Summary   1/30/2018    Merle Vaughan    MRN: [id number]           Patient Information     Date Of Birth          1935        Visit Information        Provider Department      1/30/2018 1:00 PM Evelyn Knight MD [facility]        Today's Diagnoses     Primary open angle glaucoma of both eyes, severe stage    -  1    Pseudophakia of both eyes          Care Instructions    Patient will continue on Cosopt (Timolol/Dorzolamide) which is a blue top drop 2x/day (12 hours apart) in the LEFT EYE ONLY.    Patient will discontinue Lumigan which is a teal top drop, FML and Alphagan (Brimonidine) which is a purple top drop.     Patient will continue using an eye shield over the right eye while sleeping and refrain from rubbing eyes.        Patient will also continue on artificial tears 4-6x/day in both eyes.      No heavy lifting, bending, stooping, straining, rubbing the eye, or getting water in the eye.  Please wear protective eyewear over the eye at all times including glasses during the day and the protective shield at bedtime.  Patient will return to clinic in 1-2 weeks with repeat dilated eye exam (right eye only).    Use the following drops (RIGHT EYE ONLY DROPS):  Antibiotic --  Ciprofloxacin:4x/day for 1 week then discontinue    Steroid -- Pred Forte/Prednisolone Acetate: every 2 hours while awake for 2 weeks then 4x/day until seen    Please be sure to call if you have any decrease in vision, increase in pain, flashing lights, redness, or a lot of drainage.            Follow-ups after your visit        Follow-up notes from your care team     Return 2 weeks with repeat dilated eye exam (right eye only).      Who to contact     Please call your clinic at [phone] to:    Ask questions about your health    Make or cancel appointments    Discuss your medicines    Learn about your test results    Speak to your doctor   If you have compliments or concerns about an experience at your  clinic, or if you wish to file a complaint, please contact [facility] Patient Relations at [phone] or email us at [email or web address]         Additional Information About Your Visit        Sentropi Information     Sentropi is an electronic gateway that provides easy, online access to your medical records. With Sentropi, you can request a clinic appointment, read your test results, renew a prescription or communicate with your care team.     To sign up for Sentropi visit the website at [email or web address]   You will be asked to enter the access code listed below, as well as some personal information. Please follow the directions to create your username and password.     Your access code is: [email or web address]  Expires: 2018  6:30 AM     Your access code will  in 90 days. If you need help or a new code, please contact your [facility] or call [phone] for assistance.        Care EveryWhere ID     This is your Care EveryWhere ID. This could be used by other organizations to access your [place] medical records  [id number]         Blood Pressure from Last 3 Encounters:   18 140/66    Weight from Last 3 Encounters:   18 60.3 kg (133 lb)              Today, you had the following     No orders found for display       Primary Care Provider Fax #    Physician No Ref-Primary [phone]       No address on file        Equal Access to Services     ALBINO CALLAWAY : Hadii margarita contreraso Soalma, waaxda luqadaha, qaybta kaalmada adeegyada, ernie rodriguez . So [facility] [phone].    ATENCIÓN: Si habla español, tiene a solis disposición servicios gratuitos de asistencia lingüística. Llame al [phone].    We comply with applicable federal civil rights laws and [place] laws. We do not discriminate on the basis of race, color, national origin, age, disability, sex, sexual orientation, or gender  identity.            Thank you!     Thank you for choosing EYE CLINIC  for your care. Our goal is always to provide you with excellent care. Hearing back from our patients is one way we can continue to improve our services. Please take a few minutes to complete the written survey that you may receive in the mail after your visit with us. Thank you!             Your Updated Medication List - Protect others around you: Learn how to safely use, store and throw away your medicines at www.disposemymeds.org.          This list is accurate as of 1/30/18  3:53 PM.  Always use your most recent med list.                   Brand Name Dispense Instructions for use Diagnosis    acetaminophen 325 MG tablet    TYLENOL     Take 650 mg by mouth        ALPHAGAN OP      Place 1 drop into the right eye 2 times daily        betamethasone dipropionate 0.05 % ointment    DIPROSONE     Apply thin layer twice daily to any itchy areas on the skin.        carboxymethylcellulose 0.5 % Soln ophthalmic solution    REFRESH PLUS     One drop in each eye 4 times daily as needed        clonazePAM 0.5 MG tablet    klonoPIN     Take 0.5 mg by mouth        dorzolamide-timolol 2-0.5 % ophthalmic solution    COSOPT     Place 1 drop into BOTH eyes twice daily.        doxycycline 100 MG capsule    VIBRAMYCIN     Take 100 mg by mouth daily        FLUOROMETHOLONE OP      Apply 1 drop to eye daily Right eye, per med sheet with pt        melatonin 1 MG Caps      Take 5 mg by mouth        mirtazapine 7.5 MG Tabs tablet    REMERON     Take 30 mg by mouth        SIMVASTATIN PO      Take 20 mg by mouth daily

## 2018-03-27 ENCOUNTER — OFFICE VISIT (OUTPATIENT)
Dept: OPHTHALMOLOGY | Facility: CLINIC | Age: 83
End: 2018-03-27
Attending: OPHTHALMOLOGY
Payer: COMMERCIAL

## 2018-03-27 DIAGNOSIS — H40.1133 PRIMARY OPEN ANGLE GLAUCOMA OF BOTH EYES, SEVERE STAGE: Primary | ICD-10-CM

## 2018-03-27 PROCEDURE — G0463 HOSPITAL OUTPT CLINIC VISIT: HCPCS | Mod: ZF

## 2018-03-27 ASSESSMENT — TONOMETRY
OS_IOP_MMHG: 5
IOP_METHOD: APPLANATION
OD_IOP_MMHG: 14

## 2018-03-27 ASSESSMENT — CUP TO DISC RATIO
OD_RATIO: 0.9
OS_RATIO: 0.8

## 2018-03-27 ASSESSMENT — VISUAL ACUITY
METHOD: SNELLEN - LINEAR
OD_SC+: +1
OD_SC: 20/100
OS_PH_SC: 20/30-2
OS_SC: 20/40
OS_SC+: -1

## 2018-03-27 ASSESSMENT — CONF VISUAL FIELD
OS_SUPERIOR_NASAL_RESTRICTION: 1
OD_SUPERIOR_NASAL_RESTRICTION: 1
OS_INFERIOR_TEMPORAL_RESTRICTION: 3
OS_SUPERIOR_TEMPORAL_RESTRICTION: 3
OD_INFERIOR_TEMPORAL_RESTRICTION: 3
OD_INFERIOR_NASAL_RESTRICTION: 3
OD_SUPERIOR_TEMPORAL_RESTRICTION: 1

## 2018-03-27 ASSESSMENT — SLIT LAMP EXAM - LIDS
COMMENTS: BLEPHARITIS, MGD
COMMENTS: BLEPHARITIS, MGD

## 2018-03-27 ASSESSMENT — EXTERNAL EXAM - LEFT EYE: OS_EXAM: NORMAL

## 2018-03-27 ASSESSMENT — EXTERNAL EXAM - RIGHT EYE: OD_EXAM: NORMAL

## 2018-03-27 NOTE — MR AVS SNAPSHOT
After Visit Summary   3/27/2018    Merle Vaughan    MRN: [id number]           Patient Information     Date Of Birth          1935        Visit Information        Provider Department      3/27/2018 1:15 PM Evelyn Knight MD [facility]        Today's Diagnoses     Primary open angle glaucoma of both eyes, severe stage    -  1      Care Instructions    Patient will continue on Cosopt (Timolol/Dorzolamide) which is a blue top drop 2x/day (12 hours apart) in the LEFT EYE ONLY.    Patient will discontinue Lumigan which is a teal top drop, FML and Alphagan (Brimonidine) which is a purple top drop.     Patient will continue using an eye shield over the right eye while sleeping and refrain from rubbing eyes.        Patient will also continue on artificial tears 4-6x/day in both eyes.      No heavy lifting, bending, stooping, straining, rubbing the eye, or getting water in the eye.  Please wear protective eyewear over the eye at all times including glasses during the day and the protective shield at bedtime.  Patient will return to clinic in 2-3 months with repeat IOP check, visual field test (OU:LVC), dilated eye exam and refraction for new glasses.    Use the following drops (RIGHT EYE ONLY DROPS):  Antibiotic --  Ciprofloxacin:Discontinue     Steroid -- Pred Forte/Prednisolone Acetate: 2x/day for 3 weeks then discontinue     Please be sure to call if you have any decrease in vision, increase in pain, flashing lights, redness, or a lot of drainage.            Follow-ups after your visit        Follow-up notes from your care team     Return  2-3 months with repeat IOP check, visual field test (OU:LVC), dilated eye exam and refraction for n.      Who to contact     Please call your clinic at [phone] to:    Ask questions about your health    Make or cancel appointments    Discuss your medicines    Learn about your test results    Speak to your doctor            Additional Information About Your  Visit        DimensionU (formerly Tabula Digita) Information     DimensionU (formerly Tabula Digita) is an electronic gateway that provides easy, online access to your medical records. With DimensionU (formerly Tabula Digita), you can request a clinic appointment, read your test results, renew a prescription or communicate with your care team.     To sign up for DimensionU (formerly Tabula Digita) visit the website at [email or web address] ([email or web address]   You will be asked to enter the access code listed below, as well as some personal information. Please follow the directions to create your username and password.     Your access code is: [email or web address]  Expires: 2018  7:30 AM     Your access code will  in 90 days. If you need help or a new code, please contact your [facility] or call [phone] for assistance.        Care EveryWhere ID     This is your Care EveryWhere ID. This could be used by other organizations to access your [place] medical records  [id number]         Blood Pressure from Last 3 Encounters:   18 140/66    Weight from Last 3 Encounters:   18 60.3 kg (133 lb)              Today, you had the following     No orders found for display       Primary Care Provider Fax #    Physician No Ref-Primary [phone]       No address on file        Equal Access to Services     JENIFER CALLAWAY : Hadii margarita Greco, waaryanda mary, qaybta kaalmada stephie, ernie rodriguez . So [facility] [phone].    ATENCIÓN: Si habla español, tiene a solis disposición servicios gratuitos de asistencia lingüística. Demar al [phone].    We comply with applicable federal civil rights laws and [place] laws. We do not discriminate on the basis of race, color, national origin, age, disability, sex, sexual orientation, or gender identity.            Thank you!     Thank you for choosing EYE CLINIC  for your care. Our goal is always to provide you with excellent care. Hearing back from our patients is one way we can continue to improve our services. Please  take a few minutes to complete the written survey that you may receive in the mail after your visit with us. Thank you!             Your Updated Medication List - Protect others around you: Learn how to safely use, store and throw away your medicines at www.disposemymeds.org.          This list is accurate as of 3/27/18  3:48 PM.  Always use your most recent med list.                   Brand Name Dispense Instructions for use Diagnosis    acetaminophen 325 MG tablet    TYLENOL     Take 650 mg by mouth        ALPHAGAN OP      Place 1 drop into the right eye 2 times daily        betamethasone dipropionate 0.05 % ointment    DIPROSONE     Apply thin layer twice daily to any itchy areas on the skin.        carboxymethylcellulose 0.5 % Soln ophthalmic solution    REFRESH PLUS     One drop in each eye 4 times daily as needed        clonazePAM 0.5 MG tablet    klonoPIN     Take 0.5 mg by mouth        dorzolamide-timolol 2-0.5 % ophthalmic solution    COSOPT     Place 1 drop into BOTH eyes twice daily.        doxycycline 100 MG capsule    VIBRAMYCIN     Take 100 mg by mouth daily        FLUOROMETHOLONE OP      Apply 1 drop to eye daily Right eye, per med sheet with pt        melatonin 1 MG Caps      Take 5 mg by mouth        mirtazapine 7.5 MG Tabs tablet    REMERON     Take 30 mg by mouth        SIMVASTATIN PO      Take 20 mg by mouth daily

## 2018-03-27 NOTE — NURSING NOTE
Chief Complaints and History of Present Illnesses   Patient presents with     Follow Up For     Primary open angle glaucoma of both eyes, severe stage      HPI    Last Eye Exam:  1/30/18   Affected eye(s):  Both   Symptoms:        Unknown duration    Frequency:  Constant       Do you have eye pain now?:  No      Comments:  Merle is here today for a follow up of Primary open angle glaucoma of both eyes, severe stage. She is having her RE dilated today. She broke her glasses since last visit. She has been using a magnifying glass to see small items.  She lives in assisted living and does not think her meds have been changed as directed. She still gets cosopt, and says she thinks she still gets lumigan, Alphagan and FML, although she is not certain.     Arnulfo Reeves COT 1:39 PM March 27, 2018

## 2018-03-27 NOTE — PATIENT INSTRUCTIONS
Patient will continue on Cosopt (Timolol/Dorzolamide) which is a blue top drop 2x/day (12 hours apart) in the LEFT EYE ONLY.    Patient will discontinue Lumigan which is a teal top drop, FML and Alphagan (Brimonidine) which is a purple top drop.     Patient will continue using an eye shield over the right eye while sleeping and refrain from rubbing eyes.        Patient will also continue on artificial tears 4-6x/day in both eyes.      No heavy lifting, bending, stooping, straining, rubbing the eye, or getting water in the eye.  Please wear protective eyewear over the eye at all times including glasses during the day and the protective shield at bedtime.  Patient will return to clinic in 2-3 months with repeat IOP check, visual field test (OU:LVC), dilated eye exam and refraction for new glasses.    Use the following drops (RIGHT EYE ONLY DROPS):  Antibiotic --  Ciprofloxacin:Discontinue     Steroid -- Pred Forte/Prednisolone Acetate: 2x/day for 3 weeks then discontinue     Please be sure to call if you have any decrease in vision, increase in pain, flashing lights, redness, or a lot of drainage.

## 2018-03-27 NOTE — PROGRESS NOTES
1)POAG -- s/p Tube OD (1/24/18), s/p Ahmed Tube OS (4/11/16),  first dx in 1960, ?s/p Trab in 1980s, progression OD in 2018 when pt lost to f/u  -- K pachy: 548/522 Tmax: 30s HVF: OD:[place] in 2018 (prog when pt lost to f/u) and Superior arcuate and Inf NS in 3979-5625 and OS:Dense superior and Inferior arcaute, [place] island CDR:OD:0.9 (prog noted in 2017 after being lost to f/u) and OS:0.85 HRT/OCT:Mod-Severe RNFl thinning  FHX of Glc: None Gonio:OD: and OS:open Intolerant to: Asthma/COPD:No Steroid Use: No Kidney Stones: No Sulfa Allergy: Yes, ?sick IOP targets: -- IOP improved  2)PCIOL OU -- was previously following at [facility] [place], son (gurjit) lives in [place]  3)H/O Hypotony with choroidals OD -- resolved with new recurrence (8/16) ?2/2 eye rubbing -- needs to wear shield OD qHS at all times  4)KITA/Bleph/Allergic Keratoconjunctivitis -- has seen Dr. 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 Diaz -- was stable on FML BID    MD: [facility]3] administering gtts     MD:pt lost to f/u from 6/17 to 12/17  -- etiology of progression OD -- pt wants me to talk to Gurjit [phone] -- consent obtained from him prior to surgery      Patient will continue on Cosopt (Timolol/Dorzolamide) which is a blue top drop 2x/day (12 hours apart) in the LEFT EYE ONLY.    Patient will discontinue Lumigan which is a teal top drop, FML and Alphagan (Brimonidine) which is a purple top drop.     Patient will continue using an eye shield over the right eye while sleeping and refrain from rubbing eyes.        Patient will also continue on artificial tears 4-6x/day in both eyes.      No heavy lifting, bending, stooping, straining, rubbing the eye, or getting water in the eye.  Please wear protective eyewear over the eye at all times including glasses during the day and the protective shield at bedtime.  Patient will  return to clinic in 2-3 months with repeat IOP check, visual field test (OU:LVC), dilated eye exam and refraction for new glasses.    Use the following drops (RIGHT EYE ONLY DROPS):  Antibiotic --  Ciprofloxacin:Discontinue     Steroid -- Pred Forte/Prednisolone Acetate: 2x/day for 3 weeks then discontinue     Please be sure to call if you have any decrease in vision, increase in pain, flashing lights, redness, or a lot of drainage.        Attending Physician Attestation:  Complete documentation of historical and exam elements from today's encounter can be found in the full encounter summary report (not reduplicated in this progress note). I personally obtained the chief complaint(s) and history of present illness.  I confirmed and edited as necessary the review of systems, past medical/surgical history, family history, social history, and examination findings as documented by others; and I examined the patient myself. I personally reviewed the relevant tests, images, and reports as documented above. I formulated and edited as necessary the assessment and plan and discussed the findings and management plan with the patient and family.  - Evelyn Knight MD

## 2018-05-29 ENCOUNTER — OFFICE VISIT (OUTPATIENT)
Dept: OPHTHALMOLOGY | Facility: CLINIC | Age: 83
End: 2018-05-29
Attending: OPHTHALMOLOGY
Payer: COMMERCIAL

## 2018-05-29 DIAGNOSIS — H40.1133 PRIMARY OPEN ANGLE GLAUCOMA OF BOTH EYES, SEVERE STAGE: Primary | ICD-10-CM

## 2018-05-29 DIAGNOSIS — Z96.1 PSEUDOPHAKIA OF BOTH EYES: ICD-10-CM

## 2018-05-29 PROCEDURE — G0463 HOSPITAL OUTPT CLINIC VISIT: HCPCS | Mod: ZF

## 2018-05-29 PROCEDURE — 92083 EXTENDED VISUAL FIELD XM: CPT | Mod: ZF | Performed by: OPHTHALMOLOGY

## 2018-05-29 PROCEDURE — 92015 DETERMINE REFRACTIVE STATE: CPT | Mod: ZF

## 2018-05-29 ASSESSMENT — REFRACTION_MANIFEST
OS_AXIS: 075
OD_ADD: +3.00
OD_SPHERE: -0.50
OS_ADD: +3.00
OS_SPHERE: -1.75
OS_CYLINDER: +1.25
OD_CYLINDER: SPHERE

## 2018-05-29 ASSESSMENT — VISUAL ACUITY
OS_SC: 20/70
METHOD: SNELLEN - LINEAR
OS_PH_SC: 20/30
OD_SC: 20/100
OS_SC+: +2
OD_SC+: -1

## 2018-05-29 ASSESSMENT — TONOMETRY
OD_IOP_MMHG: 19
OS_IOP_MMHG: 15
IOP_METHOD: APPLANATION

## 2018-05-29 ASSESSMENT — CONF VISUAL FIELD
OD_INFERIOR_TEMPORAL_RESTRICTION: 3
OD_SUPERIOR_TEMPORAL_RESTRICTION: 1
OD_INFERIOR_NASAL_RESTRICTION: 3
OD_SUPERIOR_NASAL_RESTRICTION: 1
OS_SUPERIOR_TEMPORAL_RESTRICTION: 1
OS_SUPERIOR_NASAL_RESTRICTION: 1

## 2018-05-29 ASSESSMENT — CUP TO DISC RATIO
OD_RATIO: 0.9
OS_RATIO: 0.8

## 2018-05-29 ASSESSMENT — SLIT LAMP EXAM - LIDS
COMMENTS: BLEPHARITIS, MGD
COMMENTS: BLEPHARITIS, MGD

## 2018-05-29 ASSESSMENT — EXTERNAL EXAM - RIGHT EYE: OD_EXAM: NORMAL

## 2018-05-29 ASSESSMENT — EXTERNAL EXAM - LEFT EYE: OS_EXAM: NORMAL

## 2018-05-29 NOTE — MR AVS SNAPSHOT
After Visit Summary   5/29/2018    Merle Vaughan    MRN: [id number]           Patient Information     Date Of Birth          1935        Visit Information        Provider Department      5/29/2018 12:45 PM Evelyn Knight MD [facility]        Today's Diagnoses     Primary open angle glaucoma of both eyes, severe stage    -  1    Pseudophakia of both eyes          Care Instructions    Patient will continue on Cosopt (Timolol/Dorzolamide) which is a blue top drop 2x/day (12 hours apart) in the LEFT EYE ONLY.    Patient will discontinue Lumigan which is a teal top drop, FML and Alphagan (Brimonidine) which is a purple top drop.     Patient will continue using an eye shield over the right eye while sleeping and refrain from rubbing eyes.        Patient will also continue on artificial tears 4-6x/day in both eyes.      No heavy lifting, bending, stooping, straining, rubbing the eye, or getting water in the eye.  Please wear protective eyewear over the eye at all times including glasses during the day and the protective shield at bedtime.  Patient will return to clinic in 3-4 months with repeat IOP check, visual field test (OU:LVC), and OCT with RNFL analysis.    Use the following drops (RIGHT EYE ONLY DROPS):  Antibiotic --  Ciprofloxacin:Discontinue     Steroid -- Pred Forte/Prednisolone Acetate: Discontinue    Please be sure to call if you have any decrease in vision, increase in pain, flashing lights, redness, or a lot of drainage.            Follow-ups after your visit        Follow-up notes from your care team     Return 3-4 months with repeat IOP check, visual field test (OU:LVC), and OCT with RNFL analysis..      Who to contact     Please call your clinic at [phone] to:    Ask questions about your health    Make or cancel appointments    Discuss your medicines    Learn about your test results    Speak to your doctor            Additional Information About Your Visit        Vance  Information     LookTracker is an electronic gateway that provides easy, online access to your medical records. With LookTracker, you can request a clinic appointment, read your test results, renew a prescription or communicate with your care team.     To sign up for LookTracker visit the website at [email or web address]   You will be asked to enter the access code listed below, as well as some personal information. Please follow the directions to create your username and password.     Your access code is: [email or web address]  Expires: 2018  7:30 AM     Your access code will  in 90 days. If you need help or a new code, please contact your [facility] or call [phone] for assistance.        Care EveryWhere ID     This is your Care EveryWhere ID. This could be used by other organizations to access your [place] medical records  [id number]         Blood Pressure from Last 3 Encounters:   18 140/66    Weight from Last 3 Encounters:   18 60.3 kg (133 lb)              We Performed the Following     Low Vision Central OU        Primary Care Provider Fax #    Physician No Ref-Primary [phone]       No address on file        Equal Access to Services     [facility] : Hadii margarita Greco, waaxda luclifton, qaybta kaalmada adeitalo, ernie rodriguez . So [facility] [phone].    ATENCIÓN: Si habla español, tiene a solis disposición servicios gratuitos de asistencia lingüística. Llame al [phone].    We comply with applicable federal civil rights laws and [place] laws. We do not discriminate on the basis of race, color, national origin, age, disability, sex, sexual orientation, or gender identity.            Thank you!     Thank you for choosing EYE CLINIC  for your care. Our goal is always to provide you with excellent care. Hearing back from our patients is one way we can continue to improve our services. Please take a few minutes to complete  the written survey that you may receive in the mail after your visit with us. Thank you!             Your Updated Medication List - Protect others around you: Learn how to safely use, store and throw away your medicines at www.disposemymeds.org.          This list is accurate as of 5/29/18  3:42 PM.  Always use your most recent med list.                   Brand Name Dispense Instructions for use Diagnosis    acetaminophen 325 MG tablet    TYLENOL     Take 650 mg by mouth        ALPHAGAN OP      Place 1 drop into the right eye 2 times daily        betamethasone dipropionate 0.05 % ointment    DIPROSONE     Apply thin layer twice daily to any itchy areas on the skin.        carboxymethylcellulose 0.5 % Soln ophthalmic solution    REFRESH PLUS     One drop in each eye 4 times daily as needed        clonazePAM 0.5 MG tablet    klonoPIN     Take 0.5 mg by mouth        dorzolamide-timolol 2-0.5 % ophthalmic solution    COSOPT     Place 1 drop into BOTH eyes twice daily.        doxycycline 100 MG capsule    VIBRAMYCIN     Take 100 mg by mouth daily        FLUOROMETHOLONE OP      Apply 1 drop to eye daily Right eye, per med sheet with pt        melatonin 1 MG Caps      Take 5 mg by mouth        mirtazapine 7.5 MG Tabs tablet    REMERON     Take 30 mg by mouth        SIMVASTATIN PO      Take 20 mg by mouth daily

## 2018-05-29 NOTE — PROGRESS NOTES
1)POAG -- s/p Tube OD (1/24/18), s/p Ahmed Tube OS (4/11/16),  first dx in 1960, ?s/p Trab in 1980s, progression OD in 2018 when pt lost to f/u  -- K pachy: 548/522 Tmax: 30s HVF: OD:Central island in 2018 (prog when pt lost to f/u) and Superior arcuate and Inf NS in 5517-6693 and OS:Dense superior and Inferior arcaute, central island CDR:OD:0.9 (prog noted in 2017 after being lost to f/u) and OS:0.85 HRT/OCT:Mod-Severe RNFl thinning  FHX of Glc: None Gonio:OD: and OS:open Intolerant to: Asthma/COPD:No Steroid Use: No Kidney Stones: No Sulfa Allergy: Yes, ?sick IOP targets: -- IOP improved  2)PCIOL OU -- was previously [place] at [facility] [place], son (gurjit) lives in [place]  3)H/O Hypotony with choroidals OD -- resolved with new recurrence (8/16) ?2/2 eye rubbing -- needs to wear shield OD qHS at all times  4)KITA/Bleph/Allergic Keratoconjunctivitis -- has seen Dr. 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 Diaz -- was stable on FML BID    MD: [facility] administering gtts     MD:pt lost to f/u from 6/17 to 12/17  -- etiology of progression OD -- pt wants me to talk to Gurjit [phone] -- consent obtained from him prior to surgery      Patient will continue on Cosopt (Timolol/Dorzolamide) which is a blue top drop 2x/day (12 hours apart) in the LEFT EYE ONLY.    Patient will discontinue Lumigan which is a teal top drop, FML and Alphagan (Brimonidine) which is a purple top drop.     Patient will continue using an eye shield over the right eye while sleeping and refrain from rubbing eyes.        Patient will also continue on artificial tears 4-6x/day in both eyes.      No heavy lifting, bending, stooping, straining, rubbing the eye, or getting water in the eye.  Please wear protective eyewear over the eye at all times including glasses during the day and the protective shield at bedtime.  Patient will  return to clinic in 3-4 months with repeat IOP check, visual field test (OU:LVC), and OCT with RNFL analysis.    Use the following drops (RIGHT EYE ONLY DROPS):  Antibiotic --  Ciprofloxacin:Discontinue     Steroid -- Pred Forte/Prednisolone Acetate: Discontinue    Please be sure to call if you have any decrease in vision, increase in pain, flashing lights, redness, or a lot of drainage.        Attending Physician Attestation:  Complete documentation of historical and exam elements from today's encounter can be found in the full encounter summary report (not reduplicated in this progress note). I personally obtained the chief complaint(s) and history of present illness.  I confirmed and edited as necessary the review of systems, past medical/surgical history, family history, social history, and examination findings as documented by others; and I examined the patient myself. I personally reviewed the relevant tests, images, and reports as documented above. I formulated and edited as necessary the assessment and plan and discussed the findings and management plan with the patient and family.  - Evelyn Knight MD

## 2018-05-29 NOTE — NURSING NOTE
Chief Complaints and History of Present Illnesses   Patient presents with     Follow Up For     Primary open angle glaucoma of both eyes, severe stage (Prim...     HPI    Affected eye(s):  Both   Symptoms:     No blurred vision   Decreased vision   No distorted vision   No flashes      Duration:  2 months   Frequency:  Constant       Do you have eye pain now?:  No      Comments:   States va is the same since last visit    Cosopt LEFT EYE ONLY 12:30 PM   Lumigan 12 PM today   Alphagan 12:15 PM today    artificial tears 4 X daily both eyes.   Vinh Villanueva  12:58 PM May 29, 2018

## 2018-05-29 NOTE — PATIENT INSTRUCTIONS
Patient will continue on Cosopt (Timolol/Dorzolamide) which is a blue top drop 2x/day (12 hours apart) in the LEFT EYE ONLY.    Patient will discontinue Lumigan which is a teal top drop, FML and Alphagan (Brimonidine) which is a purple top drop.     Patient will continue using an eye shield over the right eye while sleeping and refrain from rubbing eyes.        Patient will also continue on artificial tears 4-6x/day in both eyes.      No heavy lifting, bending, stooping, straining, rubbing the eye, or getting water in the eye.  Please wear protective eyewear over the eye at all times including glasses during the day and the protective shield at bedtime.  Patient will return to clinic in 3-4 months with repeat IOP check, visual field test (OU:LVC), and OCT with RNFL analysis.    Use the following drops (RIGHT EYE ONLY DROPS):  Antibiotic --  Ciprofloxacin:Discontinue     Steroid -- Pred Forte/Prednisolone Acetate: Discontinue    Please be sure to call if you have any decrease in vision, increase in pain, flashing lights, redness, or a lot of drainage.

## 2018-06-26 ENCOUNTER — RECORDS - HEALTHEAST (OUTPATIENT)
Dept: LAB | Facility: CLINIC | Age: 83
End: 2018-06-26

## 2018-06-27 LAB
ANION GAP SERPL CALCULATED.3IONS-SCNC: 6 MMOL/L (ref 5–18)
BASOPHILS # BLD AUTO: 0 THOU/UL (ref 0–0.2)
BASOPHILS NFR BLD AUTO: 1 % (ref 0–2)
BUN SERPL-MCNC: 26 MG/DL (ref 8–28)
CALCIUM SERPL-MCNC: 9.1 MG/DL (ref 8.5–10.5)
CHLORIDE BLD-SCNC: 107 MMOL/L (ref 98–107)
CO2 SERPL-SCNC: 28 MMOL/L (ref 22–31)
CREAT SERPL-MCNC: 0.67 MG/DL (ref 0.6–1.1)
EOSINOPHIL # BLD AUTO: 0.3 THOU/UL (ref 0–0.4)
EOSINOPHIL NFR BLD AUTO: 5 % (ref 0–6)
ERYTHROCYTE [DISTWIDTH] IN BLOOD BY AUTOMATED COUNT: 13.6 % (ref 11–14.5)
GFR SERPL CREATININE-BSD FRML MDRD: >60 ML/MIN/1.73M2
GLUCOSE BLD-MCNC: 81 MG/DL (ref 70–125)
HCT VFR BLD AUTO: 33.3 % (ref 35–47)
HGB BLD-MCNC: 10.9 G/DL (ref 12–16)
LYMPHOCYTES # BLD AUTO: 2.3 THOU/UL (ref 0.8–4.4)
LYMPHOCYTES NFR BLD AUTO: 37 % (ref 20–40)
MCH RBC QN AUTO: 31.6 PG (ref 27–34)
MCHC RBC AUTO-ENTMCNC: 32.7 G/DL (ref 32–36)
MCV RBC AUTO: 97 FL (ref 80–100)
MONOCYTES # BLD AUTO: 0.6 THOU/UL (ref 0–0.9)
MONOCYTES NFR BLD AUTO: 10 % (ref 2–10)
NEUTROPHILS # BLD AUTO: 2.9 THOU/UL (ref 2–7.7)
NEUTROPHILS NFR BLD AUTO: 47 % (ref 50–70)
PLATELET # BLD AUTO: 288 THOU/UL (ref 140–440)
PMV BLD AUTO: 10.6 FL (ref 8.5–12.5)
POTASSIUM BLD-SCNC: 3.8 MMOL/L (ref 3.5–5)
RBC # BLD AUTO: 3.45 MILL/UL (ref 3.8–5.4)
SODIUM SERPL-SCNC: 141 MMOL/L (ref 136–145)
WBC: 6.3 THOU/UL (ref 4–11)

## 2018-07-13 ENCOUNTER — RECORDS - HEALTHEAST (OUTPATIENT)
Dept: LAB | Facility: CLINIC | Age: 83
End: 2018-07-13

## 2018-07-16 LAB
ANION GAP SERPL CALCULATED.3IONS-SCNC: 8 MMOL/L (ref 5–18)
BUN SERPL-MCNC: 20 MG/DL (ref 8–28)
CALCIUM SERPL-MCNC: 9.1 MG/DL (ref 8.5–10.5)
CHLORIDE BLD-SCNC: 108 MMOL/L (ref 98–107)
CO2 SERPL-SCNC: 23 MMOL/L (ref 22–31)
CREAT SERPL-MCNC: 0.61 MG/DL (ref 0.6–1.1)
ERYTHROCYTE [DISTWIDTH] IN BLOOD BY AUTOMATED COUNT: 14.3 % (ref 11–14.5)
GFR SERPL CREATININE-BSD FRML MDRD: >60 ML/MIN/1.73M2
GLUCOSE BLD-MCNC: 78 MG/DL (ref 70–125)
HCT VFR BLD AUTO: 30.4 % (ref 35–47)
HGB BLD-MCNC: 9.6 G/DL (ref 12–16)
MCH RBC QN AUTO: 30.7 PG (ref 27–34)
MCHC RBC AUTO-ENTMCNC: 31.6 G/DL (ref 32–36)
MCV RBC AUTO: 97 FL (ref 80–100)
PLATELET # BLD AUTO: 255 THOU/UL (ref 140–440)
PMV BLD AUTO: 10.8 FL (ref 8.5–12.5)
POTASSIUM BLD-SCNC: 4.2 MMOL/L (ref 3.5–5)
RBC # BLD AUTO: 3.13 MILL/UL (ref 3.8–5.4)
SODIUM SERPL-SCNC: 139 MMOL/L (ref 136–145)
WBC: 6.9 THOU/UL (ref 4–11)

## 2019-03-01 ENCOUNTER — RECORDS - HEALTHEAST (OUTPATIENT)
Dept: LAB | Facility: CLINIC | Age: 84
End: 2019-03-01

## 2019-03-01 LAB
ANION GAP SERPL CALCULATED.3IONS-SCNC: 5 MMOL/L (ref 5–18)
BUN SERPL-MCNC: 20 MG/DL (ref 8–28)
CALCIUM SERPL-MCNC: 9.2 MG/DL (ref 8.5–10.5)
CHLORIDE BLD-SCNC: 104 MMOL/L (ref 98–107)
CO2 SERPL-SCNC: 32 MMOL/L (ref 22–31)
CREAT SERPL-MCNC: 0.68 MG/DL (ref 0.6–1.1)
GFR SERPL CREATININE-BSD FRML MDRD: >60 ML/MIN/1.73M2
GLUCOSE BLD-MCNC: 80 MG/DL (ref 70–125)
POTASSIUM BLD-SCNC: 4.1 MMOL/L (ref 3.5–5)
SODIUM SERPL-SCNC: 141 MMOL/L (ref 136–145)

## 2019-04-11 ENCOUNTER — OFFICE VISIT (OUTPATIENT)
Dept: OPHTHALMOLOGY | Facility: CLINIC | Age: 84
End: 2019-04-11
Attending: OPHTHALMOLOGY
Payer: COMMERCIAL

## 2019-04-11 DIAGNOSIS — Z96.1 PSEUDOPHAKIA OF BOTH EYES: ICD-10-CM

## 2019-04-11 DIAGNOSIS — H40.1133 PRIMARY OPEN ANGLE GLAUCOMA OF BOTH EYES, SEVERE STAGE: Primary | ICD-10-CM

## 2019-04-11 PROCEDURE — 92015 DETERMINE REFRACTIVE STATE: CPT | Mod: ZF

## 2019-04-11 PROCEDURE — G0463 HOSPITAL OUTPT CLINIC VISIT: HCPCS | Mod: ZF

## 2019-04-11 ASSESSMENT — VISUAL ACUITY
METHOD: SNELLEN - LINEAR
OS_CC: 20/40-
OD_PH_CC: 20/80
OS_PH_CC: 20/30
OD_CC: 20/200
OS_PH_CC+: -2
OD_PH_CC+: -2
OS_CC+: +2

## 2019-04-11 ASSESSMENT — REFRACTION_WEARINGRX
OS_SPHERE: -1.75
OD_SPHERE: -0.50
OS_ADD: +3.00
OS_CYLINDER: +1.25
SPECS_TYPE: BIFOCAL
OD_ADD: +3.00
OD_CYLINDER: SPHERE
OS_AXIS: 075

## 2019-04-11 ASSESSMENT — CONF VISUAL FIELD
OD_SUPERIOR_NASAL_RESTRICTION: 3
OS_SUPERIOR_NASAL_RESTRICTION: 3
OD_INFERIOR_NASAL_RESTRICTION: 3
OD_SUPERIOR_TEMPORAL_RESTRICTION: 3
OD_INFERIOR_TEMPORAL_RESTRICTION: 3
OS_SUPERIOR_TEMPORAL_RESTRICTION: 3

## 2019-04-11 ASSESSMENT — TONOMETRY
OS_IOP_MMHG: 14
IOP_METHOD: TONOPEN
OD_IOP_MMHG: 19

## 2019-04-11 ASSESSMENT — CUP TO DISC RATIO
OS_RATIO: 0.8
OD_RATIO: 0.9

## 2019-04-11 ASSESSMENT — REFRACTION_MANIFEST
OS_SPHERE: -2.25
OD_AXIS: 180
OD_CYLINDER: +0.50
OD_SPHERE: -0.50
OS_AXIS: 075
OS_CYLINDER: +1.25
OS_ADD: +3.00
OD_ADD: +3.00

## 2019-04-11 ASSESSMENT — SLIT LAMP EXAM - LIDS
COMMENTS: BLEPHARITIS, MGD
COMMENTS: BLEPHARITIS, MGD

## 2019-04-11 ASSESSMENT — ENCOUNTER SYMPTOMS: JOINT SWELLING: 1

## 2019-04-11 ASSESSMENT — EXTERNAL EXAM - LEFT EYE: OS_EXAM: NORMAL

## 2019-04-11 ASSESSMENT — EXTERNAL EXAM - RIGHT EYE: OD_EXAM: NORMAL

## 2019-04-11 NOTE — PROGRESS NOTES
1)POAG -- s/p Tube OD (1/24/18), s/p Tube OS (4/11/16),  first dx in 1960, ?s/p Trab in 1980s, progression OD in 2018 when pt lost to f/u  -- K pachy: 548/522 Tmax: 30s HVF: OD:Central island in 2018 (prog when pt lost to f/u) and Superior arcuate and Inf NS in 8526-6573 and OS:Dense superior and Inferior arcaute, central island CDR:OD:0.9 (prog noted in 2017 after being lost to f/u) and OS:0.85 HRT/OCT:Mod-Severe RNFl thinning  FHX of Glc: None Gonio:OD: and OS:open Intolerant to: Asthma/COPD:No Steroid Use: No Kidney Stones: No Sulfa Allergy: Yes, ?sick IOP targets: -- IOP improved  2)PCIOL OU -- was previously following at [facility] [place], son (gurjit) lives in [place]  3)H/O Hypotony with choroidals OD -- resolved with new recurrence (8/16) ?2/2 eye rubbing -- needs to wear shield OD qHS at all times  4)KITA/Bleph/Allergic Keratoconjunctivitis -- has seen Dr. 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 Diaz -- was stable on FML BID    MD: [facility] administering gtts     MD:pt lost to f/u from 6/17 to 12/17  -- etiology of progression OD -- pt wants me to talk to Gurjit [phone] -- consent obtained from him prior to surgery      Patient will continue on Cosopt (Timolol/Dorzolamide) which is a blue top drop 2x/day (12 hours apart) in the LEFT EYE ONLY.      Patient will continue using an eye shield over the right eye while sleeping and refrain from rubbing eyes.        Patient will also continue on artificial tears 4-6x/day in both eyes.       Patient will return to clinic in 4-6 months with repeat IOP check, dilated eye exam, visual field test (OU:C), and OCT with RNFL analysis.      Attending Physician Attestation:  Complete documentation of historical and exam elements from today's encounter can be found in the full encounter summary report (not reduplicated in this progress note). I personally  obtained the chief complaint(s) and history of present illness.  I confirmed and edited as necessary the review of systems, past medical/surgical history, family history, social history, and examination findings as documented by others; and I examined the patient myself. I personally reviewed the relevant tests, images, and reports as documented above. I formulated and edited as necessary the assessment and plan and discussed the findings and management plan with the patient and family.  - Evelyn Knight MD

## 2019-04-11 NOTE — PATIENT INSTRUCTIONS
Patient will continue on Cosopt (Timolol/Dorzolamide) which is a blue top drop 2x/day (12 hours apart) in the LEFT EYE ONLY.    Patient will continue using an eye shield over the right eye while sleeping and refrain from rubbing eyes.        Patient will also continue on artificial tears 4-6x/day in both eyes.     Patient will return to clinic in 4-6 months with repeat IOP check, dilated eye exam, visual field test (OU:LVC), and OCT with RNFL analysis.

## 2019-04-11 NOTE — NURSING NOTE
Chief Complaints and History of Present Illnesses   Patient presents with     Consult For     Chief Complaint(s) and History of Present Illness(es)     Consult For     Laterality: both eyes    Quality: blurred vision    Severity: moderate    Associated symptoms: dryness.  Negative for eye pain and redness    Treatments tried: artificial tears and eye drops    Response to treatment: moderate improvement    Pain scale: 0/10              Comments     Referred by Dr Alvarado for Glaucoma eval and a new glasses prescription    Vision, since losing her glasses, has been blurry.     Only other comment is an update that she is no longer in remission for breast cancer.     Ocular meds: Cosopt BID LE, Genteal gtts at bedtime BE    Delfina Sewell COT 1:22 PM April 11, 2019

## 2019-08-02 ENCOUNTER — RECORDS - HEALTHEAST (OUTPATIENT)
Dept: LAB | Facility: CLINIC | Age: 84
End: 2019-08-02

## 2019-08-02 LAB
FLUAV AG SPEC QL IA: NORMAL
FLUBV AG SPEC QL IA: NORMAL

## 2019-08-04 ENCOUNTER — RECORDS - HEALTHEAST (OUTPATIENT)
Dept: LAB | Facility: CLINIC | Age: 84
End: 2019-08-04

## 2019-08-05 LAB
ANION GAP SERPL CALCULATED.3IONS-SCNC: 8 MMOL/L (ref 5–18)
BASOPHILS # BLD AUTO: 0 THOU/UL (ref 0–0.2)
BASOPHILS NFR BLD AUTO: 0 % (ref 0–2)
BUN SERPL-MCNC: 25 MG/DL (ref 8–28)
CALCIUM SERPL-MCNC: 9.4 MG/DL (ref 8.5–10.5)
CHLORIDE BLD-SCNC: 105 MMOL/L (ref 98–107)
CO2 SERPL-SCNC: 27 MMOL/L (ref 22–31)
CREAT SERPL-MCNC: 0.6 MG/DL (ref 0.6–1.1)
EOSINOPHIL # BLD AUTO: 0.1 THOU/UL (ref 0–0.4)
EOSINOPHIL NFR BLD AUTO: 1 % (ref 0–6)
ERYTHROCYTE [DISTWIDTH] IN BLOOD BY AUTOMATED COUNT: 15.3 % (ref 11–14.5)
GFR SERPL CREATININE-BSD FRML MDRD: >60 ML/MIN/1.73M2
GLUCOSE BLD-MCNC: 89 MG/DL (ref 70–125)
HCT VFR BLD AUTO: 28.8 % (ref 35–47)
HGB BLD-MCNC: 9.1 G/DL (ref 12–16)
LYMPHOCYTES # BLD AUTO: 1.4 THOU/UL (ref 0.8–4.4)
LYMPHOCYTES NFR BLD AUTO: 14 % (ref 20–40)
MCH RBC QN AUTO: 27.7 PG (ref 27–34)
MCHC RBC AUTO-ENTMCNC: 31.6 G/DL (ref 32–36)
MCV RBC AUTO: 88 FL (ref 80–100)
MONOCYTES # BLD AUTO: 1.4 THOU/UL (ref 0–0.9)
MONOCYTES NFR BLD AUTO: 14 % (ref 2–10)
NEUTROPHILS # BLD AUTO: 7 THOU/UL (ref 2–7.7)
NEUTROPHILS NFR BLD AUTO: 71 % (ref 50–70)
PLATELET # BLD AUTO: 422 THOU/UL (ref 140–440)
PMV BLD AUTO: 10.5 FL (ref 8.5–12.5)
POTASSIUM BLD-SCNC: 3.3 MMOL/L (ref 3.5–5)
RBC # BLD AUTO: 3.29 MILL/UL (ref 3.8–5.4)
SODIUM SERPL-SCNC: 140 MMOL/L (ref 136–145)
WBC: 10.1 THOU/UL (ref 4–11)

## 2019-09-03 ENCOUNTER — OFFICE VISIT (OUTPATIENT)
Dept: OPHTHALMOLOGY | Facility: CLINIC | Age: 84
End: 2019-09-03
Attending: OPHTHALMOLOGY
Payer: COMMERCIAL

## 2019-09-03 DIAGNOSIS — H40.1133 PRIMARY OPEN ANGLE GLAUCOMA OF BOTH EYES, SEVERE STAGE: Primary | ICD-10-CM

## 2019-09-03 DIAGNOSIS — Z96.1 PSEUDOPHAKIA OF BOTH EYES: ICD-10-CM

## 2019-09-03 PROCEDURE — 92133 CPTRZD OPH DX IMG PST SGM ON: CPT | Mod: ZF | Performed by: OPHTHALMOLOGY

## 2019-09-03 PROCEDURE — G0463 HOSPITAL OUTPT CLINIC VISIT: HCPCS | Mod: ZF

## 2019-09-03 ASSESSMENT — VISUAL ACUITY
OD_SC: 20/200
OS_SC: 20/30
OS_SC+: -2
METHOD: SNELLEN - LINEAR

## 2019-09-03 ASSESSMENT — CUP TO DISC RATIO
OS_RATIO: 0.8
OD_RATIO: 0.9

## 2019-09-03 ASSESSMENT — CONF VISUAL FIELD
OD_SUPERIOR_NASAL_RESTRICTION: 1
METHOD: COUNTING FINGERS
OD_INFERIOR_TEMPORAL_RESTRICTION: 3
OD_SUPERIOR_TEMPORAL_RESTRICTION: 1
OD_INFERIOR_NASAL_RESTRICTION: 3
OS_SUPERIOR_TEMPORAL_RESTRICTION: 1
OS_SUPERIOR_NASAL_RESTRICTION: 3
OS_INFERIOR_TEMPORAL_RESTRICTION: 3

## 2019-09-03 ASSESSMENT — EXTERNAL EXAM - RIGHT EYE: OD_EXAM: NORMAL

## 2019-09-03 ASSESSMENT — EXTERNAL EXAM - LEFT EYE: OS_EXAM: NORMAL

## 2019-09-03 ASSESSMENT — SLIT LAMP EXAM - LIDS
COMMENTS: BLEPHARITIS, MGD
COMMENTS: BLEPHARITIS, MGD

## 2019-09-03 ASSESSMENT — TONOMETRY
OD_IOP_MMHG: 17
IOP_METHOD: TONOPEN
OS_IOP_MMHG: 13

## 2019-09-03 NOTE — NURSING NOTE
Chief Complaints and History of Present Illnesses   Patient presents with     Glaucoma Follow-Up     4 month follow-up Primary open angle glaucoma of both eyes, severe stage     Chief Complaint(s) and History of Present Illness(es)     Glaucoma Follow-Up     Comments: 4 month follow-up Primary open angle glaucoma of both eyes, severe stage              Comments     Pt denies any significant vision changes in either eye since last visit.  Denies any pain, pressure, irritation, discharge, and tearing.    Tiffanie Monaco OT 1:00 PM September 3, 2019

## 2019-09-03 NOTE — PROGRESS NOTES
1)POAG OD>OS  -- s/p Tube OD (1/24/18), s/p Tube OS (4/11/16),  first dx in 1960, ?s/p Trab in 1980s, progression OD in 2018 when pt lost to f/u, etiology of vision loss OD  -- K pachy: 548/522 Tmax: 30s HVF: OD:Central island in 2018 (prog when pt lost to f/u) and Superior arcuate and Inf NS in 1394-3419 and OS:Dense superior and Inferior arcaute, central island CDR:OD:0.9 (prog noted in 2017 after being lost to f/u) and OS:0.85 HRT/OCT:Severe RNFl thinning (marked prog from 1029-2354    FHX of Glc: None Gonio:OD: and OS:open Intolerant to: Asthma/COPD:No Steroid Use: No Kidney Stones: No Sulfa Allergy: Yes, ?sick IOP targets: -- IOP improved  2)PCIOL OU -- was previously following at [facility] [place], son (gurjit) lives in [place]  3)H/O Hypotony with choroidals OD -- resolved with new recurrence (8/16) ?2/2 eye rubbing -- needs to wear shield OD qHS at all times  4)KITA/Bleph/Allergic Keratoconjunctivitis -- has seen Dr. 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 Diaz -- was stable on FML BID  6)New Superior Lipid Keratopathy OS -- unable to do Slit Lamp photos 2/2 positioning (9/2019) --  needs cornea eval      MD: [facility] [place] administering gtts     MD:pt lost to f/u from 6/17 to 12/17  -- etiology of progression OD -- pt wants me to talk to Gurjit [phone] -- consent obtained from him prior to surgery    MD:Unable to do HVF 2/2 positioning       Patient will continue on Cosopt (Timolol/Dorzolamide) which is a blue top drop 2x/day (12 hours apart) in the LEFT EYE ONLY.    Patient will continue using an eye shield over the right eye while sleeping and refrain from rubbing eyes.        Patient will also continue on artificial tears 4-6x/day in both eyes.     Patient will return to clinic in 4-6 months with repeat IOP check and dilated eye exam in the glaucoma clinic.  Patient will also follow up in the  cornea clinic for cornea eval of left eye.        Attending Physician Attestation:  Complete documentation of historical and exam elements from today's encounter can be found in the full encounter summary report (not reduplicated in this progress note). I personally obtained the chief complaint(s) and history of present illness.  I confirmed and edited as necessary the review of systems, past medical/surgical history, family history, social history, and examination findings as documented by others; and I examined the patient myself. I personally reviewed the relevant tests, images, and reports as documented above. I formulated and edited as necessary the assessment and plan and discussed the findings and management plan with the patient and family.  - Evelyn Knight MD

## 2019-09-03 NOTE — PATIENT INSTRUCTIONS
Patient will continue on Cosopt (Timolol/Dorzolamide) which is a blue top drop 2x/day (12 hours apart) in the LEFT EYE ONLY.    Patient will continue using an eye shield over the right eye while sleeping and refrain from rubbing eyes.        Patient will also continue on artificial tears 4-6x/day in both eyes.     Patient will return to clinic in 4-6 months with repeat IOP check and dilated eye exam in the glaucoma clinic.  Patient will also follow up in the cornea clinic for cornea eval of left eye.

## 2019-10-09 ENCOUNTER — RECORDS - HEALTHEAST (OUTPATIENT)
Dept: LAB | Facility: CLINIC | Age: 84
End: 2019-10-09

## 2019-10-10 LAB
ANION GAP SERPL CALCULATED.3IONS-SCNC: 7 MMOL/L (ref 5–18)
BASOPHILS # BLD AUTO: 0 THOU/UL (ref 0–0.2)
BASOPHILS NFR BLD AUTO: 1 % (ref 0–2)
BUN SERPL-MCNC: 32 MG/DL (ref 8–28)
CALCIUM SERPL-MCNC: 9.5 MG/DL (ref 8.5–10.5)
CHLORIDE BLD-SCNC: 104 MMOL/L (ref 98–107)
CHOLEST SERPL-MCNC: 195 MG/DL
CO2 SERPL-SCNC: 30 MMOL/L (ref 22–31)
CREAT SERPL-MCNC: 0.76 MG/DL (ref 0.6–1.1)
EOSINOPHIL # BLD AUTO: 0.3 THOU/UL (ref 0–0.4)
EOSINOPHIL NFR BLD AUTO: 6 % (ref 0–6)
ERYTHROCYTE [DISTWIDTH] IN BLOOD BY AUTOMATED COUNT: 16.6 % (ref 11–14.5)
FASTING STATUS PATIENT QL REPORTED: NO
GFR SERPL CREATININE-BSD FRML MDRD: >60 ML/MIN/1.73M2
GLUCOSE BLD-MCNC: 77 MG/DL (ref 70–125)
HCT VFR BLD AUTO: 29.9 % (ref 35–47)
HDLC SERPL-MCNC: 52 MG/DL
HGB BLD-MCNC: 9.3 G/DL (ref 12–16)
LDLC SERPL CALC-MCNC: 131 MG/DL
LYMPHOCYTES # BLD AUTO: 1.7 THOU/UL (ref 0.8–4.4)
LYMPHOCYTES NFR BLD AUTO: 32 % (ref 20–40)
MCH RBC QN AUTO: 27.5 PG (ref 27–34)
MCHC RBC AUTO-ENTMCNC: 31.1 G/DL (ref 32–36)
MCV RBC AUTO: 89 FL (ref 80–100)
MONOCYTES # BLD AUTO: 0.7 THOU/UL (ref 0–0.9)
MONOCYTES NFR BLD AUTO: 12 % (ref 2–10)
NEUTROPHILS # BLD AUTO: 2.6 THOU/UL (ref 2–7.7)
NEUTROPHILS NFR BLD AUTO: 49 % (ref 50–70)
PLATELET # BLD AUTO: 298 THOU/UL (ref 140–440)
PMV BLD AUTO: 11.3 FL (ref 8.5–12.5)
POTASSIUM BLD-SCNC: 4.1 MMOL/L (ref 3.5–5)
RBC # BLD AUTO: 3.38 MILL/UL (ref 3.8–5.4)
SODIUM SERPL-SCNC: 141 MMOL/L (ref 136–145)
TRIGL SERPL-MCNC: 59 MG/DL
TSH SERPL DL<=0.005 MIU/L-ACNC: 2.19 UIU/ML (ref 0.3–5)
WBC: 5.4 THOU/UL (ref 4–11)

## 2020-02-06 ENCOUNTER — RECORDS - HEALTHEAST (OUTPATIENT)
Dept: LAB | Facility: CLINIC | Age: 85
End: 2020-02-06

## 2020-02-06 LAB
ANION GAP SERPL CALCULATED.3IONS-SCNC: 6 MMOL/L (ref 5–18)
BUN SERPL-MCNC: 20 MG/DL (ref 8–28)
CALCIUM SERPL-MCNC: 9.3 MG/DL (ref 8.5–10.5)
CHLORIDE BLD-SCNC: 106 MMOL/L (ref 98–107)
CO2 SERPL-SCNC: 28 MMOL/L (ref 22–31)
CREAT SERPL-MCNC: 0.64 MG/DL (ref 0.6–1.1)
GFR SERPL CREATININE-BSD FRML MDRD: >60 ML/MIN/1.73M2
GLUCOSE BLD-MCNC: 68 MG/DL (ref 70–125)
POTASSIUM BLD-SCNC: 4 MMOL/L (ref 3.5–5)
SODIUM SERPL-SCNC: 140 MMOL/L (ref 136–145)

## 2020-03-18 ENCOUNTER — TELEPHONE (OUTPATIENT)
Dept: OPHTHALMOLOGY | Facility: CLINIC | Age: 85
End: 2020-03-18

## 2020-03-23 ENCOUNTER — TELEPHONE (OUTPATIENT)
Dept: OPHTHALMOLOGY | Facility: CLINIC | Age: 85
End: 2020-03-23

## 2020-03-23 NOTE — TELEPHONE ENCOUNTER
COVID-19 RESCHEDULES    Date contacted: March 23, 2020 1:11 PM    Type of contact:  left message with Gurjit (pt's phone was busy)      Current appointment date: 03/31/2020    Attending provider: Eugene    Current Eye Symptoms: na    Refills needed: na    Best contact for rescheduling: na    Best date/time for rescheduling: na    Ashley Pascual, COT1:11 PM March 23, 2020

## 2020-12-22 ENCOUNTER — OFFICE VISIT (OUTPATIENT)
Dept: OPHTHALMOLOGY | Facility: CLINIC | Age: 85
End: 2020-12-22
Attending: OPHTHALMOLOGY
Payer: COMMERCIAL

## 2020-12-22 DIAGNOSIS — H52.13 MYOPIA OF BOTH EYES: ICD-10-CM

## 2020-12-22 DIAGNOSIS — H40.1133 PRIMARY OPEN ANGLE GLAUCOMA (POAG) OF BOTH EYES, SEVERE STAGE: Primary | ICD-10-CM

## 2020-12-22 DIAGNOSIS — H04.123 DRY EYE SYNDROME OF BOTH EYES: ICD-10-CM

## 2020-12-22 DIAGNOSIS — Z96.1 PSEUDOPHAKIA OF BOTH EYES: ICD-10-CM

## 2020-12-22 PROCEDURE — 92014 COMPRE OPH EXAM EST PT 1/>: CPT | Performed by: OPHTHALMOLOGY

## 2020-12-22 PROCEDURE — G0463 HOSPITAL OUTPT CLINIC VISIT: HCPCS

## 2020-12-22 PROCEDURE — 92015 DETERMINE REFRACTIVE STATE: CPT

## 2020-12-22 RX ORDER — FLUOCINONIDE TOPICAL SOLUTION USP, 0.05% 0.5 MG/ML
SOLUTION TOPICAL
COMMUNITY
Start: 2020-06-18

## 2020-12-22 RX ORDER — SULFASALAZINE 500 MG/1
TABLET ORAL
COMMUNITY
Start: 2020-11-19

## 2020-12-22 RX ORDER — LETROZOLE 2.5 MG/1
2.5 TABLET, FILM COATED ORAL DAILY
COMMUNITY

## 2020-12-22 RX ORDER — FAMOTIDINE 40 MG/1
TABLET, FILM COATED ORAL
COMMUNITY
Start: 2020-05-17

## 2020-12-22 RX ORDER — MULTIVITAMIN WITH FOLIC ACID 400 MCG
TABLET ORAL
COMMUNITY
Start: 2020-11-01

## 2020-12-22 RX ORDER — SERTRALINE HYDROCHLORIDE 100 MG/1
TABLET, FILM COATED ORAL
COMMUNITY
Start: 2020-11-01

## 2020-12-22 RX ORDER — CHLORHEXIDINE GLUCONATE ORAL RINSE 1.2 MG/ML
SOLUTION DENTAL
COMMUNITY
Start: 2020-10-17

## 2020-12-22 ASSESSMENT — VISUAL ACUITY
OD_SC: 20/200
OS_SC+: -2
OS_SC: 20/40
METHOD: SNELLEN - LINEAR

## 2020-12-22 ASSESSMENT — CONF VISUAL FIELD
OD_INFERIOR_TEMPORAL_RESTRICTION: 1
OS_SUPERIOR_NASAL_RESTRICTION: 1
OS_INFERIOR_TEMPORAL_RESTRICTION: 1
OS_SUPERIOR_TEMPORAL_RESTRICTION: 1
OD_SUPERIOR_NASAL_RESTRICTION: 1
OS_INFERIOR_NASAL_RESTRICTION: 1
OD_INFERIOR_NASAL_RESTRICTION: 1
OD_SUPERIOR_TEMPORAL_RESTRICTION: 1

## 2020-12-22 ASSESSMENT — REFRACTION_MANIFEST
OS_SPHERE: +1.75
OS_SPHERE: -1.25
OS_ADD: +3.00
OD_SPHERE: +2.50
OD_AXIS: 077
OD_CYLINDER: +1.50
OD_CYLINDER: +1.50
OD_ADD: +3.00
OD_AXIS: 077
OS_CYLINDER: SPHERE
OD_SPHERE: -0.50

## 2020-12-22 ASSESSMENT — EXTERNAL EXAM - RIGHT EYE: OD_EXAM: NORMAL

## 2020-12-22 ASSESSMENT — CUP TO DISC RATIO
OS_RATIO: 0.8
OD_RATIO: 0.9

## 2020-12-22 ASSESSMENT — TONOMETRY
OS_IOP_MMHG: 15
IOP_METHOD: TONOPEN
OD_IOP_MMHG: 15

## 2020-12-22 ASSESSMENT — SLIT LAMP EXAM - LIDS
COMMENTS: BLEPHARITIS, MGD
COMMENTS: BLEPHARITIS, MGD

## 2020-12-22 ASSESSMENT — EXTERNAL EXAM - LEFT EYE: OS_EXAM: NORMAL

## 2020-12-22 NOTE — NURSING NOTE
Chief Complaints and History of Present Illnesses   Patient presents with     Glaucoma Follow-Up     Chief Complaint(s) and History of Present Illness(es)     Glaucoma Follow-Up     Laterality: both eyes    Associated symptoms: dryness, headache (migraines, stable) and redness.  Negative for eye pain and double vision    Pain scale: 0/10              Comments     Glaucoma follow up.  Her vision seems to be worsening in both eyes since her last exam.  Both eyes seem dry.  She tells me that she reads all day, which is a favorite pass time.    She has glasses with her, but tells me that the glasses are a random pair from her nursing home (she like the frames).    Per care record, she is getting Cosopt 2x/day (12 hours apart) in the left eye and artifical tears 4 times a day in both eyes.    Ashley Pascual, COT 1:03 PM  December 22, 2020

## 2021-01-08 ENCOUNTER — RECORDS - HEALTHEAST (OUTPATIENT)
Dept: LAB | Facility: CLINIC | Age: 86
End: 2021-01-08

## 2021-01-11 LAB
ANION GAP SERPL CALCULATED.3IONS-SCNC: 7 MMOL/L (ref 5–18)
BASOPHILS # BLD AUTO: 0 THOU/UL (ref 0–0.2)
BASOPHILS NFR BLD AUTO: 1 % (ref 0–2)
BUN SERPL-MCNC: 19 MG/DL (ref 8–28)
CALCIUM SERPL-MCNC: 9.3 MG/DL (ref 8.5–10.5)
CHLORIDE BLD-SCNC: 103 MMOL/L (ref 98–107)
CO2 SERPL-SCNC: 29 MMOL/L (ref 22–31)
CREAT SERPL-MCNC: 0.62 MG/DL (ref 0.6–1.1)
EOSINOPHIL # BLD AUTO: 0.3 THOU/UL (ref 0–0.4)
EOSINOPHIL NFR BLD AUTO: 5 % (ref 0–6)
ERYTHROCYTE [DISTWIDTH] IN BLOOD BY AUTOMATED COUNT: 14.6 % (ref 11–14.5)
GFR SERPL CREATININE-BSD FRML MDRD: >60 ML/MIN/1.73M2
GLUCOSE BLD-MCNC: 73 MG/DL (ref 70–125)
HBA1C MFR BLD: 4.9 %
HCT VFR BLD AUTO: 33.1 % (ref 35–47)
HGB BLD-MCNC: 11 G/DL (ref 12–16)
IMM GRANULOCYTES # BLD: 0 THOU/UL
IMM GRANULOCYTES NFR BLD: 1 %
LYMPHOCYTES # BLD AUTO: 1.7 THOU/UL (ref 0.8–4.4)
LYMPHOCYTES NFR BLD AUTO: 26 % (ref 20–40)
MCH RBC QN AUTO: 29.8 PG (ref 27–34)
MCHC RBC AUTO-ENTMCNC: 33.2 G/DL (ref 32–36)
MCV RBC AUTO: 90 FL (ref 80–100)
MONOCYTES # BLD AUTO: 0.8 THOU/UL (ref 0–0.9)
MONOCYTES NFR BLD AUTO: 12 % (ref 2–10)
NEUTROPHILS # BLD AUTO: 3.5 THOU/UL (ref 2–7.7)
NEUTROPHILS NFR BLD AUTO: 55 % (ref 50–70)
PLATELET # BLD AUTO: 357 THOU/UL (ref 140–440)
PMV BLD AUTO: 11.3 FL (ref 8.5–12.5)
POTASSIUM BLD-SCNC: 4.3 MMOL/L (ref 3.5–5)
RBC # BLD AUTO: 3.69 MILL/UL (ref 3.8–5.4)
SODIUM SERPL-SCNC: 139 MMOL/L (ref 136–145)
TSH SERPL DL<=0.005 MIU/L-ACNC: 2.56 UIU/ML (ref 0.3–5)
WBC: 6.3 THOU/UL (ref 4–11)

## 2021-05-19 DIAGNOSIS — H40.1133 PRIMARY OPEN ANGLE GLAUCOMA (POAG) OF BOTH EYES, SEVERE STAGE: Primary | ICD-10-CM

## 2021-05-25 ENCOUNTER — OFFICE VISIT (OUTPATIENT)
Dept: OPHTHALMOLOGY | Facility: CLINIC | Age: 86
End: 2021-05-25
Attending: OPHTHALMOLOGY
Payer: COMMERCIAL

## 2021-05-25 DIAGNOSIS — H40.1133 PRIMARY OPEN ANGLE GLAUCOMA (POAG) OF BOTH EYES, SEVERE STAGE: Primary | ICD-10-CM

## 2021-05-25 DIAGNOSIS — Z96.1 PSEUDOPHAKIA OF BOTH EYES: ICD-10-CM

## 2021-05-25 PROCEDURE — G0463 HOSPITAL OUTPT CLINIC VISIT: HCPCS

## 2021-05-25 PROCEDURE — 99214 OFFICE O/P EST MOD 30 MIN: CPT | Performed by: OPHTHALMOLOGY

## 2021-05-25 ASSESSMENT — VISUAL ACUITY
METHOD: SNELLEN - LINEAR
OS_PH_SC+: -1
OS_PH_SC: 20/30
OD_SC: 20/300
OD_PH_SC: 20/200
OS_SC+: -1
OS_SC: 20/50

## 2021-05-25 ASSESSMENT — SLIT LAMP EXAM - LIDS
COMMENTS: BLEPHARITIS, MGD
COMMENTS: BLEPHARITIS, MGD

## 2021-05-25 ASSESSMENT — TONOMETRY
OD_IOP_MMHG: 14
IOP_METHOD: TONOPEN
OS_IOP_MMHG: 16

## 2021-05-25 ASSESSMENT — CONF VISUAL FIELD
OD_INFERIOR_NASAL_RESTRICTION: 1
OS_SUPERIOR_NASAL_RESTRICTION: 3
OD_SUPERIOR_TEMPORAL_RESTRICTION: 1
METHOD: COUNTING FINGERS
OD_INFERIOR_TEMPORAL_RESTRICTION: 1
OD_SUPERIOR_NASAL_RESTRICTION: 1

## 2021-05-25 ASSESSMENT — EXTERNAL EXAM - LEFT EYE: OS_EXAM: NORMAL

## 2021-05-25 ASSESSMENT — EXTERNAL EXAM - RIGHT EYE: OD_EXAM: NORMAL

## 2021-05-25 ASSESSMENT — CUP TO DISC RATIO
OD_RATIO: 0.9
OS_RATIO: 0.8

## 2021-05-25 NOTE — NURSING NOTE
Chief Complaints and History of Present Illnesses   Patient presents with     Follow Up     Primary open angle glaucoma (POAG) of both eyes, severe stage      Chief Complaint(s) and History of Present Illness(es)     Follow Up     Laterality: both eyes    Onset: gradual    Onset: years ago    Course: gradually worsening    Associated symptoms: dryness and floaters.  Negative for eye pain, tearing, flashes and photophobia    Treatments tried: artificial tears and eye drops    Pain scale: 0/10    Comments: Primary open angle glaucoma (POAG) of both eyes, severe stage               Comments     Pt states vision has gotten worse since last visit.  Eyes get dry, scratchy, and a little painful 2-3x week.  No change to floaters.  She is not using eye shield.    Cosopt LE 2x day  AT's BE 4-6x day    HUMA Ramey May 25, 2021 1:12 PM

## 2021-05-25 NOTE — PROGRESS NOTES
CC: glaucoma follow up    HPI:  s/p Tube OD (1/24/18), s/p Tube OS (4/11/16),  first dx in 1960, ?s/p Trab in 1980s, progression OD in 2018 when pt lost to f/u, etiology of vision loss OD  -  Is not sure about the drops she is using.     A/P:  1)POAG OD>OS    - K pachy: 548/522 Tmax: 30s  CDR:OD:0.9 (prog noted in 2017 after being lost to f/u) and OS:0.85   Gonio:OD: and OS:open   - HVF: OD:Central island in 2018 (prog when pt lost to f/u) and Superior arcuate and Inf NS in 8579-6692 and OS:Dense superior and Inferior arcaute, central island (NOW, not able to sit behind the VF machine, not repeated recently)  - HRT/OCT:Severe RNFl thinning (marked prog from 2486-5542) NOW: not able to sit behind the OCT machine   - FHX of Glc: None   - Intolerant to: Asthma/COPD:No Steroid Use: No Kidney Stones: No Sulfa Allergy: Yes, ?sick   - IOP targets: -- IOP 14/16 (double checked by MD); compliant with meds    2)PCIOL OU -- was previously following at [facility] [place], son (Gurjit) lives in [place]    3)H/O Hypotony with choroidals OD -- resolved with new recurrence (8/16) ?2/2 eye rubbing -- needs to wear shield OD qHS at all times    4)KITA/Bleph/Allergic Keratoconjunctivitis -- has seen Dr. Muhammad     5)Uveitis OD>>OS -- Quiescent -- ?related to Ulcerative Colitis vs Bleb assocaited endophthalmitis OD -- s/p Vanco/Ceftaz (8/23/16) -- Dr. Diaz -- was stable on FML BID    6)Superior Lipid Keratopathy OS -- unable to do Slit Lamp photos 2/2 positioning (since 9/2019) --  needs cornea [facility] administering gtts     Pt lost to f/u from 6/17 to 12/17  -- etiology of progression OD     Unable to do HVF and OCT RNFL 2/2 positioning     Patient will continue on Cosopt (Timolol/Dorzolamide) which is a blue top drop 2x/day (12 hours apart) in the LEFT EYE ONLY.    Patient will continue using an eye shield over the right eye while sleeping and refrain from rubbing eyes.       Patient will also continue on  artificial tears 4-6x/day in both eyes.     Patient will return to clinic in 4-6 months with repeat IOP check and OCT RNFL in the glaucoma clinic.    Patient will also follow up in the cornea clinic for cornea eval of left eye.      Complete documentation of historical and exam elements from today's encounter can be found in the full encounter summary report (not reduplicated in this progress note). I personally obtained the chief complaint(s) and history of present illness.  I confirmed and edited as necessary the review of systems, past medical/surgical history, family history, social history, and examination findings as documented by others; and I examined the patient myself. I personally reviewed the relevant tests, images, and reports as documented above. I formulated and edited as necessary the assessment and plan and discussed the findings and management plan with the patient and family.    Khris Feliciano MD

## 2021-09-13 ENCOUNTER — LAB REQUISITION (OUTPATIENT)
Dept: LAB | Facility: CLINIC | Age: 86
End: 2021-09-13
Payer: COMMERCIAL

## 2021-09-13 DIAGNOSIS — I89.0 LYMPHEDEMA, NOT ELSEWHERE CLASSIFIED: ICD-10-CM

## 2021-09-13 DIAGNOSIS — I28.9: ICD-10-CM

## 2021-09-13 DIAGNOSIS — K51.80 OTHER ULCERATIVE COLITIS WITHOUT COMPLICATIONS (H): ICD-10-CM

## 2021-09-14 LAB
ALBUMIN SERPL-MCNC: 3.5 G/DL (ref 3.5–5)
ALP SERPL-CCNC: 97 U/L (ref 45–120)
ALT SERPL W P-5'-P-CCNC: 10 U/L (ref 0–45)
ANION GAP SERPL CALCULATED.3IONS-SCNC: 12 MMOL/L (ref 5–18)
AST SERPL W P-5'-P-CCNC: 22 U/L (ref 0–40)
BILIRUB DIRECT SERPL-MCNC: 0.1 MG/DL
BILIRUB SERPL-MCNC: 0.3 MG/DL (ref 0–1)
BUN SERPL-MCNC: 15 MG/DL (ref 8–28)
CALCIUM SERPL-MCNC: 9.7 MG/DL (ref 8.5–10.5)
CHLORIDE BLD-SCNC: 102 MMOL/L (ref 98–107)
CO2 SERPL-SCNC: 23 MMOL/L (ref 22–31)
CREAT SERPL-MCNC: 0.56 MG/DL (ref 0.6–1.1)
ERYTHROCYTE [DISTWIDTH] IN BLOOD BY AUTOMATED COUNT: 14.4 % (ref 10–15)
GFR SERPL CREATININE-BSD FRML MDRD: 86 ML/MIN/1.73M2
GLUCOSE BLD-MCNC: 61 MG/DL (ref 70–125)
HCT VFR BLD AUTO: 37.9 % (ref 35–47)
HGB BLD-MCNC: 12.3 G/DL (ref 11.7–15.7)
MCH RBC QN AUTO: 28.6 PG (ref 26.5–33)
MCHC RBC AUTO-ENTMCNC: 32.5 G/DL (ref 31.5–36.5)
MCV RBC AUTO: 88 FL (ref 78–100)
PLATELET # BLD AUTO: 373 10E3/UL (ref 150–450)
POTASSIUM BLD-SCNC: 4.3 MMOL/L (ref 3.5–5)
PROT SERPL-MCNC: 6.8 G/DL (ref 6–8)
RBC # BLD AUTO: 4.3 10E6/UL (ref 3.8–5.2)
SODIUM SERPL-SCNC: 137 MMOL/L (ref 136–145)
TSH SERPL DL<=0.005 MIU/L-ACNC: 2.46 UIU/ML (ref 0.3–5)
WBC # BLD AUTO: 6.8 10E3/UL (ref 4–11)

## 2021-09-14 PROCEDURE — 80053 COMPREHEN METABOLIC PANEL: CPT | Mod: ORL | Performed by: FAMILY MEDICINE

## 2021-09-14 PROCEDURE — P9604 ONE-WAY ALLOW PRORATED TRIP: HCPCS | Mod: ORL | Performed by: FAMILY MEDICINE

## 2021-09-14 PROCEDURE — 82248 BILIRUBIN DIRECT: CPT | Mod: ORL | Performed by: FAMILY MEDICINE

## 2021-09-14 PROCEDURE — 85027 COMPLETE CBC AUTOMATED: CPT | Mod: ORL | Performed by: FAMILY MEDICINE

## 2021-09-14 PROCEDURE — 84443 ASSAY THYROID STIM HORMONE: CPT | Mod: ORL | Performed by: FAMILY MEDICINE

## 2021-09-14 PROCEDURE — 36415 COLL VENOUS BLD VENIPUNCTURE: CPT | Mod: ORL | Performed by: FAMILY MEDICINE

## 2021-10-10 ENCOUNTER — LAB REQUISITION (OUTPATIENT)
Dept: LAB | Facility: CLINIC | Age: 86
End: 2021-10-10
Payer: COMMERCIAL

## 2021-10-10 DIAGNOSIS — R50.9 FEVER, UNSPECIFIED: ICD-10-CM

## 2021-10-10 PROCEDURE — U0003 INFECTIOUS AGENT DETECTION BY NUCLEIC ACID (DNA OR RNA); SEVERE ACUTE RESPIRATORY SYNDROME CORONAVIRUS 2 (SARS-COV-2) (CORONAVIRUS DISEASE [COVID-19]), AMPLIFIED PROBE TECHNIQUE, MAKING USE OF HIGH THROUGHPUT TECHNOLOGIES AS DESCRIBED BY CMS-2020-01-R: HCPCS | Mod: ORL | Performed by: INTERNAL MEDICINE

## 2021-10-12 LAB — SARS-COV-2 RNA RESP QL NAA+PROBE: NOT DETECTED

## 2021-10-13 ENCOUNTER — OFFICE VISIT (OUTPATIENT)
Dept: OPHTHALMOLOGY | Facility: CLINIC | Age: 86
End: 2021-10-13
Attending: OPHTHALMOLOGY
Payer: COMMERCIAL

## 2021-10-13 DIAGNOSIS — H04.123 DRY EYE SYNDROME OF BOTH EYES: ICD-10-CM

## 2021-10-13 DIAGNOSIS — H40.1133 PRIMARY OPEN ANGLE GLAUCOMA (POAG) OF BOTH EYES, SEVERE STAGE: Primary | ICD-10-CM

## 2021-10-13 DIAGNOSIS — Z96.1 PSEUDOPHAKIA OF BOTH EYES: ICD-10-CM

## 2021-10-13 DIAGNOSIS — H18.49 LIPID KERATOPATHY: ICD-10-CM

## 2021-10-13 PROCEDURE — 92133 CPTRZD OPH DX IMG PST SGM ON: CPT | Performed by: OPHTHALMOLOGY

## 2021-10-13 PROCEDURE — G0463 HOSPITAL OUTPT CLINIC VISIT: HCPCS

## 2021-10-13 PROCEDURE — 92014 COMPRE OPH EXAM EST PT 1/>: CPT | Mod: GC | Performed by: OPHTHALMOLOGY

## 2021-10-13 ASSESSMENT — VISUAL ACUITY
OS_SC+: -2
OS_PH_SC: 20/40
OS_SC: 20/50
OD_SC: 20/150
METHOD: SNELLEN - LINEAR

## 2021-10-13 ASSESSMENT — CUP TO DISC RATIO
OS_RATIO: NO VIEW
OD_RATIO: 0.9

## 2021-10-13 ASSESSMENT — CONF VISUAL FIELD
OS_INFERIOR_NASAL_RESTRICTION: 3
OD_SUPERIOR_NASAL_RESTRICTION: 3
OS_SUPERIOR_TEMPORAL_RESTRICTION: 3
OD_SUPERIOR_TEMPORAL_RESTRICTION: 3
OS_SUPERIOR_NASAL_RESTRICTION: 3
OS_INFERIOR_TEMPORAL_RESTRICTION: 3
OD_INFERIOR_NASAL_RESTRICTION: 1
OD_INFERIOR_TEMPORAL_RESTRICTION: 1

## 2021-10-13 ASSESSMENT — TONOMETRY
OD_IOP_MMHG: 09
OS_IOP_MMHG: 10
IOP_METHOD: TONOPEN

## 2021-10-13 ASSESSMENT — EXTERNAL EXAM - LEFT EYE: OS_EXAM: NORMAL

## 2021-10-13 ASSESSMENT — SLIT LAMP EXAM - LIDS
COMMENTS: BLEPHARITIS, MGD
COMMENTS: BLEPHARITIS, MGD

## 2021-10-13 ASSESSMENT — EXTERNAL EXAM - RIGHT EYE: OD_EXAM: NORMAL

## 2021-10-13 NOTE — NURSING NOTE
Chief Complaints and History of Present Illnesses   Patient presents with     Primary Open Angle Glaucoma Follow Up     5 month follow up both eyes       Chief Complaint(s) and History of Present Illness(es)     Primary Open Angle Glaucoma Follow Up     Comments: 5 month follow up both eyes                Comments     Pt states vision is about the same as last visit. No eye pain today.  No new flashes. Pt still seeing floaters in BE, nothing new.  Redness and dryness in BE, relief with drops.    FEROZ Hernandez October 13, 2021 2:51 PM

## 2021-10-13 NOTE — PROGRESS NOTES
CC -    Glaucoma follow up    INTERVAL HISTORY - Follow up visit    [facility] -   Merle Vaughan is a  86 year old year-old patient with history of severe POAG here for a follow-up.     Interval Hx: She reports that the vision is stable in both eyes. Using Cosopt medication in the left eye she thinks.     HPI:  s/p Tube OD (1/24/18), s/p Tube OS (4/11/16),  first dx in 1960, ?s/p Trab in 1980s, progression OD in 2018 when pt lost to f/u, etiology of vision loss OD  -    PAST OCULAR SURGERY  First dx in 1960  ?s/p Trab in 1980s  s/p Tube OD (1/24/18)  s/p Tube OS (4/11/16)  Progression OD in 2018 when pt lost to f/u, etiology of vision loss OD  -    RETINAL IMAGING:  OCT RNFL 10/13/21  OD - G = 46, severely thinned, NS/TS/T/TI thinning; borderline N/NI. - Stable since 2019.   OS - G = 36, severely thinned, TS/T/TI/NI/N thinning; borderline NS thinning - mildly worsened c/t 2019.     ASSESSMENT & PLAN    # Primary Open Angle Glaucoma, right eye > left eye   FH: None   Pachymetry: 548/522  CDR:OD:0.9 (prog noted in 2017 after being lost to f/u) and OS:0.85  Gonioscopy: right eye and left eye open  Tmax: 30s   Today's IOP: 9 / 10 mmHg   Target IOP:  Low teens  Current medications: Cosopy BID left eye; compliant with meds  Intolerant to: Asthma/COPD: No Steroid Use: No Kidney Stones: No Sulfa Allergy: Yes, ?sick   Visual field: 2018 (is not able to repeat due to being weak; gets tired during a 2-3 minute slit lamp exam)   - OD:Central island in 2018 (prog when pt lost to f/u) and Superior arcuate and Inf NS in 5047-7784    - OS:Dense superior and Inferior arcaute, central island (NOW, not able to sit behind the VF machine, not repeated recently)  Nerve OCT 10/13/21:    OD - G = 46, severely thinned, NS/TS/T/TI thinning; borderline N/NI.    OS - G = 36, severely thinned, TS/T/TI/NI/N thinning; borderline NS thinning  - Pt lost to f/u from 6/17 to 12/17  -- etiology of progression    - [facility] administering  gtts    - Continue Cosopt BID left eye     # PCIOL OU -- [place] [facility] [place] (Gurjit) lives in [place]    # H/O Hypotony with choroidals OD -- resolved with new recurrence (8/16) ?2/2 eye rubbing -- needs to wear shield OD qHS at all times    # KITA/Bleph/Allergic Keratoconjunctivitis -- has seen Dr. Muhammad in the past    - AT's PRN    - Patient will continue using an eye shield over the right eye while sleeping and refrain from rubbing eyes.       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 Diaz -- was stable on FML BID    # Superior Lipid Keratopathy OS -- unable to do Slit Lamp photos 2/2 positioning (since 9/2019)    --  needs cornea eval      return to clinic: - Cornea Evaluation; 6 months-  OCT RNFL each eye, IOP Check    - Evaluation with Dr. Gtz for MRx  / Low vision     Kevin Houser MD - PGY3  Department of Ophthalmology  Pager: [phone]    Complete documentation of historical and exam elements from today's encounter can be found in the full encounter summary report (not reduplicated in this progress note). I personally obtained the chief complaint(s) and history of present illness.  I confirmed and edited as necessary the review of systems, past medical/surgical history, family history, social history, and examination findings as documented by others; and I examined the patient myself. I personally reviewed the relevant tests, images, and reports as documented above. I formulated and edited as necessary the assessment and plan and discussed the findings and management plan with the patient and family.    Khris Feliciano MD, PhD

## 2021-10-30 ENCOUNTER — LAB REQUISITION (OUTPATIENT)
Dept: LAB | Facility: CLINIC | Age: 86
End: 2021-10-30
Payer: COMMERCIAL

## 2021-10-30 DIAGNOSIS — F02.80 DEMENTIA IN OTHER DISEASES CLASSIFIED ELSEWHERE WITHOUT BEHAVIORAL DISTURBANCE: ICD-10-CM

## 2021-11-06 ENCOUNTER — LAB REQUISITION (OUTPATIENT)
Dept: LAB | Facility: CLINIC | Age: 86
End: 2021-11-06
Payer: COMMERCIAL

## 2021-11-06 DIAGNOSIS — F03.918 UNSPECIFIED DEMENTIA WITH BEHAVIORAL DISTURBANCE: ICD-10-CM

## 2021-11-08 LAB — VALPROATE SERPL-MCNC: 25.4 UG/ML

## 2021-11-08 PROCEDURE — 36415 COLL VENOUS BLD VENIPUNCTURE: CPT | Mod: ORL | Performed by: INTERNAL MEDICINE

## 2021-11-08 PROCEDURE — P9604 ONE-WAY ALLOW PRORATED TRIP: HCPCS | Mod: ORL | Performed by: INTERNAL MEDICINE

## 2021-11-08 PROCEDURE — 80164 ASSAY DIPROPYLACETIC ACD TOT: CPT | Mod: ORL | Performed by: INTERNAL MEDICINE

## 2021-12-05 ENCOUNTER — LAB REQUISITION (OUTPATIENT)
Dept: LAB | Facility: CLINIC | Age: 86
End: 2021-12-05
Payer: COMMERCIAL

## 2021-12-05 DIAGNOSIS — G83.84 TODD'S PARALYSIS (POSTEPILEPTIC) (H): ICD-10-CM

## 2021-12-07 LAB
ALBUMIN SERPL-MCNC: 2.9 G/DL (ref 3.5–5)
ALP SERPL-CCNC: 80 U/L (ref 45–120)
ALT SERPL W P-5'-P-CCNC: <9 U/L (ref 0–45)
ANION GAP SERPL CALCULATED.3IONS-SCNC: 8 MMOL/L (ref 5–18)
AST SERPL W P-5'-P-CCNC: 15 U/L (ref 0–40)
BILIRUB SERPL-MCNC: 0.3 MG/DL (ref 0–1)
BUN SERPL-MCNC: 12 MG/DL (ref 8–28)
CALCIUM SERPL-MCNC: 9.3 MG/DL (ref 8.5–10.5)
CHLORIDE BLD-SCNC: 103 MMOL/L (ref 98–107)
CO2 SERPL-SCNC: 26 MMOL/L (ref 22–31)
CREAT SERPL-MCNC: 0.52 MG/DL (ref 0.6–1.1)
ERYTHROCYTE [DISTWIDTH] IN BLOOD BY AUTOMATED COUNT: 17.2 % (ref 10–15)
GFR SERPL CREATININE-BSD FRML MDRD: 88 ML/MIN/1.73M2
GLUCOSE BLD-MCNC: 81 MG/DL (ref 70–125)
HCT VFR BLD AUTO: 33.3 % (ref 35–47)
HGB BLD-MCNC: 10.7 G/DL (ref 11.7–15.7)
MCH RBC QN AUTO: 28.2 PG (ref 26.5–33)
MCHC RBC AUTO-ENTMCNC: 32.1 G/DL (ref 31.5–36.5)
MCV RBC AUTO: 88 FL (ref 78–100)
PLATELET # BLD AUTO: 401 10E3/UL (ref 150–450)
POTASSIUM BLD-SCNC: 4.4 MMOL/L (ref 3.5–5)
PROT SERPL-MCNC: 6.1 G/DL (ref 6–8)
RBC # BLD AUTO: 3.79 10E6/UL (ref 3.8–5.2)
SODIUM SERPL-SCNC: 137 MMOL/L (ref 136–145)
WBC # BLD AUTO: 5.8 10E3/UL (ref 4–11)

## 2021-12-07 PROCEDURE — 36415 COLL VENOUS BLD VENIPUNCTURE: CPT | Mod: ORL | Performed by: INTERNAL MEDICINE

## 2021-12-07 PROCEDURE — 80053 COMPREHEN METABOLIC PANEL: CPT | Mod: ORL | Performed by: INTERNAL MEDICINE

## 2021-12-07 PROCEDURE — 85027 COMPLETE CBC AUTOMATED: CPT | Mod: ORL | Performed by: INTERNAL MEDICINE

## 2021-12-07 PROCEDURE — P9604 ONE-WAY ALLOW PRORATED TRIP: HCPCS | Mod: ORL | Performed by: INTERNAL MEDICINE

## 2022-01-01 ENCOUNTER — LAB REQUISITION (OUTPATIENT)
Dept: LAB | Facility: CLINIC | Age: 87
End: 2022-01-01
Payer: COMMERCIAL

## 2022-01-01 DIAGNOSIS — K51.80 OTHER ULCERATIVE COLITIS WITHOUT COMPLICATIONS (H): ICD-10-CM

## 2022-01-01 DIAGNOSIS — F03.918 UNSPECIFIED DEMENTIA, UNSPECIFIED SEVERITY, WITH OTHER BEHAVIORAL DISTURBANCE (H): ICD-10-CM

## 2022-01-01 DIAGNOSIS — H40.1133 PRIMARY OPEN ANGLE GLAUCOMA (POAG) OF BOTH EYES, SEVERE STAGE: Primary | ICD-10-CM

## 2022-01-01 DIAGNOSIS — C50.911 MALIGNANT NEOPLASM OF UNSPECIFIED SITE OF RIGHT FEMALE BREAST (H): ICD-10-CM

## 2022-01-01 DIAGNOSIS — K52.9 NONINFECTIVE GASTROENTERITIS AND COLITIS, UNSPECIFIED: ICD-10-CM

## 2022-01-01 LAB
ALBUMIN SERPL BCG-MCNC: 3.3 G/DL (ref 3.5–5.2)
ALBUMIN SERPL BCG-MCNC: 3.3 G/DL (ref 3.5–5.2)
ALBUMIN SERPL BCG-MCNC: 3.4 G/DL (ref 3.5–5.2)
ALP SERPL-CCNC: 76 U/L (ref 35–104)
ALP SERPL-CCNC: 86 U/L (ref 35–104)
ALP SERPL-CCNC: 91 U/L (ref 35–104)
ALT SERPL W P-5'-P-CCNC: 5 U/L (ref 10–35)
ALT SERPL W P-5'-P-CCNC: 6 U/L (ref 10–35)
ALT SERPL W P-5'-P-CCNC: <5 U/L (ref 10–35)
ANION GAP SERPL CALCULATED.3IONS-SCNC: 6 MMOL/L (ref 7–15)
ANION GAP SERPL CALCULATED.3IONS-SCNC: 8 MMOL/L (ref 7–15)
ANION GAP SERPL CALCULATED.3IONS-SCNC: 9 MMOL/L (ref 7–15)
AST SERPL W P-5'-P-CCNC: 16 U/L (ref 10–35)
AST SERPL W P-5'-P-CCNC: 18 U/L (ref 10–35)
AST SERPL W P-5'-P-CCNC: 19 U/L (ref 10–35)
BILIRUB DIRECT SERPL-MCNC: <0.2 MG/DL (ref 0–0.3)
BILIRUB SERPL-MCNC: 0.2 MG/DL
BILIRUB SERPL-MCNC: 0.2 MG/DL
BILIRUB SERPL-MCNC: <0.2 MG/DL
BUN SERPL-MCNC: 19.6 MG/DL (ref 8–23)
BUN SERPL-MCNC: 21.5 MG/DL (ref 8–23)
BUN SERPL-MCNC: 24.9 MG/DL (ref 8–23)
CALCIUM SERPL-MCNC: 8.7 MG/DL (ref 8.8–10.2)
CALCIUM SERPL-MCNC: 8.7 MG/DL (ref 8.8–10.2)
CALCIUM SERPL-MCNC: 9.2 MG/DL (ref 8.8–10.2)
CHLORIDE SERPL-SCNC: 102 MMOL/L (ref 98–107)
CHLORIDE SERPL-SCNC: 103 MMOL/L (ref 98–107)
CHLORIDE SERPL-SCNC: 96 MMOL/L (ref 98–107)
CREAT SERPL-MCNC: 0.45 MG/DL (ref 0.51–0.95)
CREAT SERPL-MCNC: 0.52 MG/DL (ref 0.51–0.95)
CREAT SERPL-MCNC: 0.55 MG/DL (ref 0.51–0.95)
DEPRECATED HCO3 PLAS-SCNC: 25 MMOL/L (ref 22–29)
DEPRECATED HCO3 PLAS-SCNC: 27 MMOL/L (ref 22–29)
DEPRECATED HCO3 PLAS-SCNC: 30 MMOL/L (ref 22–29)
ERYTHROCYTE [DISTWIDTH] IN BLOOD BY AUTOMATED COUNT: 13.6 % (ref 10–15)
ERYTHROCYTE [DISTWIDTH] IN BLOOD BY AUTOMATED COUNT: 14.5 % (ref 10–15)
ERYTHROCYTE [DISTWIDTH] IN BLOOD BY AUTOMATED COUNT: 14.5 % (ref 10–15)
GFR SERPL CREATININE-BSD FRML MDRD: 89 ML/MIN/1.73M2
GFR SERPL CREATININE-BSD FRML MDRD: >90 ML/MIN/1.73M2
GFR SERPL CREATININE-BSD FRML MDRD: >90 ML/MIN/1.73M2
GLUCOSE SERPL-MCNC: 63 MG/DL (ref 70–99)
GLUCOSE SERPL-MCNC: 66 MG/DL (ref 70–99)
GLUCOSE SERPL-MCNC: 69 MG/DL (ref 70–99)
HCT VFR BLD AUTO: 31.7 % (ref 35–47)
HCT VFR BLD AUTO: 32.4 % (ref 35–47)
HCT VFR BLD AUTO: 34.1 % (ref 35–47)
HGB BLD-MCNC: 10.5 G/DL (ref 11.7–15.7)
HGB BLD-MCNC: 10.7 G/DL (ref 11.7–15.7)
HGB BLD-MCNC: 11 G/DL (ref 11.7–15.7)
MCH RBC QN AUTO: 30.6 PG (ref 26.5–33)
MCH RBC QN AUTO: 32.3 PG (ref 26.5–33)
MCH RBC QN AUTO: 32.7 PG (ref 26.5–33)
MCHC RBC AUTO-ENTMCNC: 32.3 G/DL (ref 31.5–36.5)
MCHC RBC AUTO-ENTMCNC: 33 G/DL (ref 31.5–36.5)
MCHC RBC AUTO-ENTMCNC: 33.1 G/DL (ref 31.5–36.5)
MCV RBC AUTO: 100 FL (ref 78–100)
MCV RBC AUTO: 93 FL (ref 78–100)
MCV RBC AUTO: 99 FL (ref 78–100)
PLATELET # BLD AUTO: 379 10E3/UL (ref 150–450)
PLATELET # BLD AUTO: 384 10E3/UL (ref 150–450)
PLATELET # BLD AUTO: 441 10E3/UL (ref 150–450)
POTASSIUM SERPL-SCNC: 3.9 MMOL/L (ref 3.4–5.3)
POTASSIUM SERPL-SCNC: 4 MMOL/L (ref 3.4–5.3)
POTASSIUM SERPL-SCNC: 4.3 MMOL/L (ref 3.4–5.3)
PROT SERPL-MCNC: 5.5 G/DL (ref 6.4–8.3)
PROT SERPL-MCNC: 5.5 G/DL (ref 6.4–8.3)
PROT SERPL-MCNC: 5.9 G/DL (ref 6.4–8.3)
RBC # BLD AUTO: 3.21 10E6/UL (ref 3.8–5.2)
RBC # BLD AUTO: 3.41 10E6/UL (ref 3.8–5.2)
RBC # BLD AUTO: 3.5 10E6/UL (ref 3.8–5.2)
SODIUM SERPL-SCNC: 132 MMOL/L (ref 136–145)
SODIUM SERPL-SCNC: 136 MMOL/L (ref 136–145)
SODIUM SERPL-SCNC: 138 MMOL/L (ref 136–145)
VALPROATE SERPL-MCNC: 20.2 UG/ML
VALPROATE SERPL-MCNC: 20.3 UG/ML
WBC # BLD AUTO: 6.5 10E3/UL (ref 4–11)
WBC # BLD AUTO: 7 10E3/UL (ref 4–11)
WBC # BLD AUTO: 7.1 10E3/UL (ref 4–11)

## 2022-01-01 PROCEDURE — 80053 COMPREHEN METABOLIC PANEL: CPT | Mod: ORL | Performed by: INTERNAL MEDICINE

## 2022-01-01 PROCEDURE — P9604 ONE-WAY ALLOW PRORATED TRIP: HCPCS | Mod: ORL | Performed by: INTERNAL MEDICINE

## 2022-01-01 PROCEDURE — P9603 ONE-WAY ALLOW PRORATED MILES: HCPCS | Mod: ORL | Performed by: INTERNAL MEDICINE

## 2022-01-01 PROCEDURE — P9604 ONE-WAY ALLOW PRORATED TRIP: HCPCS | Mod: ORL | Performed by: NURSE PRACTITIONER

## 2022-01-01 PROCEDURE — 80164 ASSAY DIPROPYLACETIC ACD TOT: CPT | Mod: ORL | Performed by: NURSE PRACTITIONER

## 2022-01-01 PROCEDURE — 82248 BILIRUBIN DIRECT: CPT | Mod: ORL | Performed by: INTERNAL MEDICINE

## 2022-01-01 PROCEDURE — 82248 BILIRUBIN DIRECT: CPT | Mod: ORL | Performed by: NURSE PRACTITIONER

## 2022-01-01 PROCEDURE — 36415 COLL VENOUS BLD VENIPUNCTURE: CPT | Mod: ORL | Performed by: NURSE PRACTITIONER

## 2022-01-01 PROCEDURE — 85027 COMPLETE CBC AUTOMATED: CPT | Mod: ORL | Performed by: INTERNAL MEDICINE

## 2022-01-01 PROCEDURE — 80164 ASSAY DIPROPYLACETIC ACD TOT: CPT | Mod: ORL | Performed by: INTERNAL MEDICINE

## 2022-01-01 PROCEDURE — 36415 COLL VENOUS BLD VENIPUNCTURE: CPT | Mod: ORL | Performed by: INTERNAL MEDICINE

## 2022-01-01 PROCEDURE — 85027 COMPLETE CBC AUTOMATED: CPT | Mod: ORL | Performed by: NURSE PRACTITIONER

## 2022-01-01 PROCEDURE — 80053 COMPREHEN METABOLIC PANEL: CPT | Mod: ORL | Performed by: NURSE PRACTITIONER

## 2022-01-18 ENCOUNTER — LAB REQUISITION (OUTPATIENT)
Dept: LAB | Facility: CLINIC | Age: 87
End: 2022-01-18
Payer: COMMERCIAL

## 2022-01-18 DIAGNOSIS — D64.9 ANEMIA, UNSPECIFIED: ICD-10-CM

## 2022-01-19 LAB
FERRITIN SERPL-MCNC: 130 NG/ML (ref 10–130)
FOLATE SERPL-MCNC: 9.6 NG/ML
HGB BLD-MCNC: 9.5 G/DL (ref 11.7–15.7)
IRON SATN MFR SERPL: 15 % (ref 20–50)
IRON SERPL-MCNC: 23 UG/DL (ref 42–175)
IRON SERPL-MCNC: 23 UG/DL (ref 42–175)
TIBC SERPL-MCNC: 153 UG/DL (ref 313–563)
TRANSFERRIN SERPL-MCNC: 122 MG/DL (ref 212–360)
TRANSFERRIN SERPL-MCNC: 122 MG/DL (ref 212–360)
VIT B12 SERPL-MCNC: 510 PG/ML (ref 213–816)

## 2022-01-19 PROCEDURE — 84466 ASSAY OF TRANSFERRIN: CPT | Mod: ORL | Performed by: INTERNAL MEDICINE

## 2022-01-19 PROCEDURE — 85018 HEMOGLOBIN: CPT | Mod: ORL | Performed by: INTERNAL MEDICINE

## 2022-01-19 PROCEDURE — 82607 VITAMIN B-12: CPT | Mod: ORL | Performed by: INTERNAL MEDICINE

## 2022-01-19 PROCEDURE — 83540 ASSAY OF IRON: CPT | Mod: ORL | Performed by: INTERNAL MEDICINE

## 2022-01-19 PROCEDURE — 36415 COLL VENOUS BLD VENIPUNCTURE: CPT | Mod: ORL | Performed by: INTERNAL MEDICINE

## 2022-01-19 PROCEDURE — 82728 ASSAY OF FERRITIN: CPT | Mod: ORL | Performed by: INTERNAL MEDICINE

## 2022-01-19 PROCEDURE — 82746 ASSAY OF FOLIC ACID SERUM: CPT | Mod: ORL | Performed by: INTERNAL MEDICINE

## 2022-01-26 ENCOUNTER — LAB REQUISITION (OUTPATIENT)
Dept: LAB | Facility: CLINIC | Age: 87
End: 2022-01-26
Payer: COMMERCIAL

## 2022-01-26 DIAGNOSIS — D64.9 ANEMIA, UNSPECIFIED: ICD-10-CM

## 2022-01-27 LAB — HGB BLD-MCNC: 9.5 G/DL (ref 11.7–15.7)

## 2022-01-27 PROCEDURE — 36415 COLL VENOUS BLD VENIPUNCTURE: CPT | Mod: ORL | Performed by: INTERNAL MEDICINE

## 2022-01-27 PROCEDURE — P9603 ONE-WAY ALLOW PRORATED MILES: HCPCS | Mod: ORL | Performed by: INTERNAL MEDICINE

## 2022-01-27 PROCEDURE — 85018 HEMOGLOBIN: CPT | Mod: ORL | Performed by: INTERNAL MEDICINE

## 2022-01-28 ENCOUNTER — LAB REQUISITION (OUTPATIENT)
Dept: LAB | Facility: CLINIC | Age: 87
End: 2022-01-28
Payer: COMMERCIAL

## 2022-01-28 DIAGNOSIS — R63.4 ABNORMAL WEIGHT LOSS: ICD-10-CM

## 2022-01-30 LAB
ALBUMIN SERPL-MCNC: 2.6 G/DL (ref 3.5–5)
ALBUMIN SERPL-MCNC: 2.6 G/DL (ref 3.5–5)
ALP SERPL-CCNC: 83 U/L (ref 45–120)
ALT SERPL W P-5'-P-CCNC: <9 U/L (ref 0–45)
ANION GAP SERPL CALCULATED.3IONS-SCNC: 7 MMOL/L (ref 5–18)
AST SERPL W P-5'-P-CCNC: 10 U/L (ref 0–40)
BILIRUB DIRECT SERPL-MCNC: 0.1 MG/DL
BILIRUB SERPL-MCNC: 0.3 MG/DL (ref 0–1)
BUN SERPL-MCNC: 13 MG/DL (ref 8–28)
CALCIUM SERPL-MCNC: 9.1 MG/DL (ref 8.5–10.5)
CHLORIDE BLD-SCNC: 101 MMOL/L (ref 98–107)
CO2 SERPL-SCNC: 30 MMOL/L (ref 22–31)
CREAT SERPL-MCNC: 0.56 MG/DL (ref 0.6–1.1)
ERYTHROCYTE [DISTWIDTH] IN BLOOD BY AUTOMATED COUNT: 15.9 % (ref 10–15)
GFR SERPL CREATININE-BSD FRML MDRD: 89 ML/MIN/1.73M2
GLUCOSE BLD-MCNC: 75 MG/DL (ref 70–125)
HCT VFR BLD AUTO: 31.5 % (ref 35–47)
HGB BLD-MCNC: 10.2 G/DL (ref 11.7–15.7)
MCH RBC QN AUTO: 28 PG (ref 26.5–33)
MCHC RBC AUTO-ENTMCNC: 32.4 G/DL (ref 31.5–36.5)
MCV RBC AUTO: 87 FL (ref 78–100)
PLATELET # BLD AUTO: 421 10E3/UL (ref 150–450)
POTASSIUM BLD-SCNC: 4 MMOL/L (ref 3.5–5)
PROT SERPL-MCNC: 6 G/DL (ref 6–8)
RBC # BLD AUTO: 3.64 10E6/UL (ref 3.8–5.2)
SODIUM SERPL-SCNC: 138 MMOL/L (ref 136–145)
WBC # BLD AUTO: 8 10E3/UL (ref 4–11)

## 2022-01-30 PROCEDURE — 80053 COMPREHEN METABOLIC PANEL: CPT | Mod: ORL | Performed by: INTERNAL MEDICINE

## 2022-01-30 PROCEDURE — 85027 COMPLETE CBC AUTOMATED: CPT | Mod: ORL | Performed by: INTERNAL MEDICINE

## 2022-01-30 PROCEDURE — 82248 BILIRUBIN DIRECT: CPT | Mod: ORL | Performed by: INTERNAL MEDICINE

## 2022-01-30 PROCEDURE — P9604 ONE-WAY ALLOW PRORATED TRIP: HCPCS | Mod: ORL | Performed by: INTERNAL MEDICINE

## 2022-01-30 PROCEDURE — 36415 COLL VENOUS BLD VENIPUNCTURE: CPT | Mod: ORL | Performed by: INTERNAL MEDICINE

## 2022-02-07 PROCEDURE — 82274 ASSAY TEST FOR BLOOD FECAL: CPT | Mod: ORL | Performed by: INTERNAL MEDICINE

## 2022-02-08 ENCOUNTER — LAB REQUISITION (OUTPATIENT)
Dept: LAB | Facility: CLINIC | Age: 87
End: 2022-02-08
Payer: COMMERCIAL

## 2022-02-08 DIAGNOSIS — R63.4 ABNORMAL WEIGHT LOSS: ICD-10-CM

## 2022-02-08 LAB — HEMOCCULT STL QL IA: POSITIVE

## 2022-02-10 ENCOUNTER — LAB REQUISITION (OUTPATIENT)
Dept: LAB | Facility: CLINIC | Age: 87
End: 2022-02-10
Payer: COMMERCIAL

## 2022-02-10 DIAGNOSIS — C50.911 MALIGNANT NEOPLASM OF UNSPECIFIED SITE OF RIGHT FEMALE BREAST (H): ICD-10-CM

## 2022-02-10 DIAGNOSIS — E03.9 HYPOTHYROIDISM, UNSPECIFIED: ICD-10-CM

## 2022-02-10 DIAGNOSIS — D64.9 ANEMIA, UNSPECIFIED: ICD-10-CM

## 2022-02-11 LAB
ANION GAP SERPL CALCULATED.3IONS-SCNC: 7 MMOL/L (ref 5–18)
BUN SERPL-MCNC: 24 MG/DL (ref 8–28)
CALCIUM SERPL-MCNC: 9 MG/DL (ref 8.5–10.5)
CHLORIDE BLD-SCNC: 104 MMOL/L (ref 98–107)
CHOLEST SERPL-MCNC: 205 MG/DL
CO2 SERPL-SCNC: 30 MMOL/L (ref 22–31)
CREAT SERPL-MCNC: 0.61 MG/DL (ref 0.6–1.1)
ERYTHROCYTE [DISTWIDTH] IN BLOOD BY AUTOMATED COUNT: 16.2 % (ref 10–15)
FASTING STATUS PATIENT QL REPORTED: ABNORMAL
GFR SERPL CREATININE-BSD FRML MDRD: 88 ML/MIN/1.73M2
GLUCOSE BLD-MCNC: 65 MG/DL (ref 70–125)
HCT VFR BLD AUTO: 31.9 % (ref 35–47)
HDLC SERPL-MCNC: 31 MG/DL
HGB BLD-MCNC: 9.9 G/DL (ref 11.7–15.7)
LDLC SERPL CALC-MCNC: 151 MG/DL
LIPASE SERPL-CCNC: <9 U/L (ref 0–52)
MCH RBC QN AUTO: 27.9 PG (ref 26.5–33)
MCHC RBC AUTO-ENTMCNC: 31 G/DL (ref 31.5–36.5)
MCV RBC AUTO: 90 FL (ref 78–100)
PLATELET # BLD AUTO: 563 10E3/UL (ref 150–450)
POTASSIUM BLD-SCNC: 4.1 MMOL/L (ref 3.5–5)
RBC # BLD AUTO: 3.55 10E6/UL (ref 3.8–5.2)
SODIUM SERPL-SCNC: 141 MMOL/L (ref 136–145)
TRIGL SERPL-MCNC: 113 MG/DL
TSH SERPL DL<=0.005 MIU/L-ACNC: 3.68 UIU/ML (ref 0.3–5)
WBC # BLD AUTO: 8.2 10E3/UL (ref 4–11)

## 2022-02-11 PROCEDURE — 80048 BASIC METABOLIC PNL TOTAL CA: CPT | Mod: ORL | Performed by: INTERNAL MEDICINE

## 2022-02-11 PROCEDURE — P9604 ONE-WAY ALLOW PRORATED TRIP: HCPCS | Mod: ORL | Performed by: INTERNAL MEDICINE

## 2022-02-11 PROCEDURE — 85027 COMPLETE CBC AUTOMATED: CPT | Mod: ORL | Performed by: INTERNAL MEDICINE

## 2022-02-11 PROCEDURE — 83690 ASSAY OF LIPASE: CPT | Mod: ORL | Performed by: INTERNAL MEDICINE

## 2022-02-11 PROCEDURE — 80061 LIPID PANEL: CPT | Mod: ORL | Performed by: INTERNAL MEDICINE

## 2022-02-11 PROCEDURE — 36415 COLL VENOUS BLD VENIPUNCTURE: CPT | Mod: ORL | Performed by: INTERNAL MEDICINE

## 2022-02-11 PROCEDURE — 84443 ASSAY THYROID STIM HORMONE: CPT | Mod: ORL | Performed by: INTERNAL MEDICINE

## 2022-02-22 ENCOUNTER — LAB REQUISITION (OUTPATIENT)
Dept: LAB | Facility: CLINIC | Age: 87
End: 2022-02-22
Payer: COMMERCIAL

## 2022-02-22 DIAGNOSIS — R63.4 ABNORMAL WEIGHT LOSS: ICD-10-CM

## 2022-02-23 LAB
ALBUMIN SERPL-MCNC: 2.6 G/DL (ref 3.5–5)
ALP SERPL-CCNC: 75 U/L (ref 45–120)
ALT SERPL W P-5'-P-CCNC: <9 U/L (ref 0–45)
AST SERPL W P-5'-P-CCNC: 11 U/L (ref 0–40)
BILIRUB DIRECT SERPL-MCNC: 0.1 MG/DL
BILIRUB SERPL-MCNC: 0.2 MG/DL (ref 0–1)
PROT SERPL-MCNC: 5.7 G/DL (ref 6–8)

## 2022-02-23 PROCEDURE — P9604 ONE-WAY ALLOW PRORATED TRIP: HCPCS | Mod: ORL | Performed by: INTERNAL MEDICINE

## 2022-02-23 PROCEDURE — 36415 COLL VENOUS BLD VENIPUNCTURE: CPT | Mod: ORL | Performed by: INTERNAL MEDICINE

## 2022-02-23 PROCEDURE — 80076 HEPATIC FUNCTION PANEL: CPT | Mod: ORL | Performed by: INTERNAL MEDICINE

## 2022-03-22 ENCOUNTER — OFFICE VISIT (OUTPATIENT)
Dept: OPHTHALMOLOGY | Facility: CLINIC | Age: 87
End: 2022-03-22
Attending: OPHTHALMOLOGY
Payer: COMMERCIAL

## 2022-03-22 DIAGNOSIS — H18.49 LIPID KERATOPATHY: ICD-10-CM

## 2022-03-22 DIAGNOSIS — H04.123 DRY EYE SYNDROME OF BOTH EYES: ICD-10-CM

## 2022-03-22 DIAGNOSIS — Z96.1 PSEUDOPHAKIA OF BOTH EYES: ICD-10-CM

## 2022-03-22 DIAGNOSIS — H40.1133 PRIMARY OPEN ANGLE GLAUCOMA (POAG) OF BOTH EYES, SEVERE STAGE: Primary | ICD-10-CM

## 2022-03-22 PROCEDURE — 92014 COMPRE OPH EXAM EST PT 1/>: CPT | Performed by: OPHTHALMOLOGY

## 2022-03-22 PROCEDURE — 92133 CPTRZD OPH DX IMG PST SGM ON: CPT | Performed by: OPHTHALMOLOGY

## 2022-03-22 PROCEDURE — G0463 HOSPITAL OUTPT CLINIC VISIT: HCPCS | Mod: 25

## 2022-03-22 ASSESSMENT — SLIT LAMP EXAM - LIDS
COMMENTS: BLEPHARITIS, MGD
COMMENTS: BLEPHARITIS, MGD

## 2022-03-22 ASSESSMENT — TONOMETRY
OS_IOP_MMHG: 07
OD_IOP_MMHG: 12
IOP_METHOD: TONOPEN

## 2022-03-22 ASSESSMENT — CUP TO DISC RATIO
OS_RATIO: 0.8
OD_RATIO: 0.95

## 2022-03-22 ASSESSMENT — CONF VISUAL FIELD: COMMENTS: UNABLE TO UNDERSTAND

## 2022-03-22 ASSESSMENT — VISUAL ACUITY
OS_PH_SC: 20/60+
OS_SC: 20/100-/+
OD_SC: CF 1'
METHOD: SNELLEN - LINEAR

## 2022-03-22 ASSESSMENT — EXTERNAL EXAM - RIGHT EYE: OD_EXAM: NORMAL

## 2022-03-22 ASSESSMENT — EXTERNAL EXAM - LEFT EYE: OS_EXAM: NORMAL

## 2022-03-22 NOTE — PROGRESS NOTES
CC -    Glaucoma follow up    Interval Hx: She reports that the vision is stable in both eyes. Using Cosopt medication in the left eye she thinks.     [facility] - Merle Vaughan is a  86 year old year-old patient with history of severe POAG here for a follow-up.     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   FH: None   Pachymetry: 548/522  CDR:OD:0.9 (prog noted in 2017 after being lost to f/u) and OS:0.85  Gonioscopy: right eye and left eye open  Tmax: 30s   Today's IOP: 12/07 mmHg   Target IOP:  Low teens  Current medications: Cosopy BID left eye; compliant with meds  Intolerant to: Asthma/COPD: No Steroid Use: No Kidney Stones: No Sulfa Allergy: Yes, ?sick   Visual field: 2018 (is not able to repeat due to being weak; gets tired during a 2-3 minute slit lamp exam)   - OD:Central island in 2018 (prog when pt lost to f/u) and Superior arcuate and Inf NS in [id number]    - OS:Dense superior and Inferior arcaute, central island (NOW, not able to sit behind the VF machine, not repeated recently)  Nerve OCT 3/22/22:    OD - G = 47, severely thinned, NS/TS/T/TI thinning; borderline N/NI; fairly stable   OS - G = 48, severely thinned, TS/T/TI/NI/N thinning; borderline NS thinning; fairly stable  - Pt lost to f/u from 6/17 to 12/17  -- etiology of progression OD   - [facility] administering gtts    - Continue Cosopt BID left eye     # PCIOL OU -- was previously following at [facility] [place], son (Gurjit) lives in [place]    # H/O Hypotony with choroidals OD -- resolved with new recurrence (8/16) ?2/2 eye rubbing -- needs to wear shield OD qHS at all times    # KITA/Bleph/Allergic Keratoconjunctivitis -- has seen Dr. Page in the past    - AT's PRN    - Patient will continue using an eye shield over the right eye while  sleeping and refrain from rubbing eyes.       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 Diaz -- was stable on FML BID    # Superior Lipid Keratopathy OS -- unable to do Slit Lamp photos 2/2 positioning (since 9/2019)    --  needs cornea eval      return to clinic:  - with me 6 months for OCT RNFL and IOP check      - Cornea, 6 months (same day visit)     - Evaluation by Dr. Gtz for MRx  / Low vision       Complete documentation of historical and exam elements from today's encounter can be found in the full encounter summary report (not reduplicated in this progress note). I personally obtained the chief complaint(s) and history of present illness.  I confirmed and edited as necessary the review of systems, past medical/surgical history, family history, social history, and examination findings as documented by others; and I examined the patient myself. I personally reviewed the relevant tests, images, and reports as documented above. I formulated and edited as necessary the assessment and plan and discussed the findings and management plan with the patient and family.    Khris Feliciano MD, PhD

## 2022-03-27 ENCOUNTER — LAB REQUISITION (OUTPATIENT)
Dept: LAB | Facility: CLINIC | Age: 87
End: 2022-03-27
Payer: COMMERCIAL

## 2022-03-27 DIAGNOSIS — D64.9 ANEMIA, UNSPECIFIED: ICD-10-CM

## 2022-03-29 LAB — HBA1C MFR BLD: 4.7 %

## 2022-03-29 PROCEDURE — 83036 HEMOGLOBIN GLYCOSYLATED A1C: CPT | Mod: ORL | Performed by: INTERNAL MEDICINE

## 2022-03-29 PROCEDURE — 36415 COLL VENOUS BLD VENIPUNCTURE: CPT | Mod: ORL | Performed by: INTERNAL MEDICINE

## 2022-03-29 PROCEDURE — P9604 ONE-WAY ALLOW PRORATED TRIP: HCPCS | Mod: ORL | Performed by: INTERNAL MEDICINE

## 2022-05-12 ENCOUNTER — OFFICE VISIT (OUTPATIENT)
Dept: OPTOMETRY | Facility: CLINIC | Age: 87
End: 2022-05-12
Payer: COMMERCIAL

## 2022-05-12 DIAGNOSIS — H52.4 PRESBYOPIA: ICD-10-CM

## 2022-05-12 DIAGNOSIS — H40.1133 PRIMARY OPEN ANGLE GLAUCOMA (POAG) OF BOTH EYES, SEVERE STAGE: Primary | ICD-10-CM

## 2022-05-12 ASSESSMENT — REFRACTION_MANIFEST
OS_SPHERE: +0.50
OS_CYLINDER: SPHERE
OS_ADD: +3.00
OD_SPHERE: PLANO

## 2022-05-12 ASSESSMENT — EXTERNAL EXAM - RIGHT EYE: OD_EXAM: NORMAL

## 2022-05-12 ASSESSMENT — SLIT LAMP EXAM - LIDS
COMMENTS: BLEPHARITIS, MGD
COMMENTS: BLEPHARITIS, MGD

## 2022-05-12 ASSESSMENT — VISUAL ACUITY
METHOD: SNELLEN - LINEAR
OS_SC: 20/60

## 2022-05-12 ASSESSMENT — EXTERNAL EXAM - LEFT EYE: OS_EXAM: NORMAL

## 2022-05-12 NOTE — PROGRESS NOTES
Assessment/Plan  (H40.1133) Primary open angle glaucoma (POAG) of both eyes, severe stage  (primary encounter diagnosis)  Comment: Significantly reduced vision OD>>OS.   Plan: Discussed findings with patient. Continue following recommendations of ophthalmology. Advised patient that many of her visual difficulties resulting from glaucoma will not improve with glasses. Emphasized the importance of adequate lighting, contrast, and magnification. Updated bifocals should help patient, as she has been wearing +4.00 OTC glasses full time. Patient is welcome to return to this provider as needed for follow up care.     (H52.4) Presbyopia  Plan: REFRACTION [[id number]]        Discussed findings with patient. New spectacle prescription dispensed to patient. Patient is welcome to return to clinic with prolonged adaptation difficulties.           45 minutes were spent on the date of the encounter doing chart review, history and exam, documentation, and further activities as noted above.    Complete documentation of historical and exam elements from today's encounter can  be found in the full encounter summary report (not reduplicated in this progress  note). I personally obtained the chief complaint(s) and history of present illness. I  confirmed and edited as necessary the review of systems, past medical/surgical  history, family history, social history, and examination findings as documented by  others; and I examined the patient myself. I personally reviewed the relevant tests,  images, and reports as documented above. I formulated and edited as necessary the  assessment and plan and discussed the findings and management plan with the  patient and family.    Flavio Gtz, OD

## 2022-05-24 ENCOUNTER — TELEPHONE (OUTPATIENT)
Dept: OPHTHALMOLOGY | Facility: CLINIC | Age: 87
End: 2022-05-24
Payer: COMMERCIAL

## 2022-05-24 NOTE — TELEPHONE ENCOUNTER
CALLED HOME # MANY TIMES/ALL CIRCUITS ARE BUSY AND MESSAGE SAID CALL CAN NOT BE ANSWERED AT THIS TIME/COULD NOT LEAVE A  MESSAGE.  CALLED CELL # LM ON VM THAT DR SHARMA 09- IS CANCELLED & [facility] TO 09- @ 12:15PM.  BMPT LETTER RTN IN MAIL.

## 2022-06-18 ENCOUNTER — LAB REQUISITION (OUTPATIENT)
Dept: LAB | Facility: CLINIC | Age: 87
End: 2022-06-18
Payer: COMMERCIAL

## 2022-06-20 LAB
ALBUMIN SERPL-MCNC: 2.8 G/DL (ref 3.5–5)
ALP SERPL-CCNC: 85 U/L (ref 45–120)
ALT SERPL W P-5'-P-CCNC: <9 U/L (ref 0–45)
ANION GAP SERPL CALCULATED.3IONS-SCNC: 8 MMOL/L (ref 5–18)
AST SERPL W P-5'-P-CCNC: 13 U/L (ref 0–40)
BASOPHILS # BLD AUTO: 0.1 10E3/UL (ref 0–0.2)
BASOPHILS NFR BLD AUTO: 1 %
BILIRUB DIRECT SERPL-MCNC: 0.1 MG/DL
BILIRUB SERPL-MCNC: 0.4 MG/DL (ref 0–1)
BUN SERPL-MCNC: 12 MG/DL (ref 8–28)
CALCIUM SERPL-MCNC: 9.2 MG/DL (ref 8.5–10.5)
CHLORIDE BLD-SCNC: 98 MMOL/L (ref 98–107)
CO2 SERPL-SCNC: 27 MMOL/L (ref 22–31)
CREAT SERPL-MCNC: 0.51 MG/DL (ref 0.6–1.1)
EOSINOPHIL # BLD AUTO: 0.7 10E3/UL (ref 0–0.7)
EOSINOPHIL NFR BLD AUTO: 7 %
ERYTHROCYTE [DISTWIDTH] IN BLOOD BY AUTOMATED COUNT: 13.8 % (ref 10–15)
GFR SERPL CREATININE-BSD FRML MDRD: >90 ML/MIN/1.73M2
GLUCOSE BLD-MCNC: 63 MG/DL (ref 70–125)
HCT VFR BLD AUTO: 34.6 % (ref 35–47)
HGB BLD-MCNC: 11.4 G/DL (ref 11.7–15.7)
IMM GRANULOCYTES # BLD: 0 10E3/UL
IMM GRANULOCYTES NFR BLD: 0 %
LYMPHOCYTES # BLD AUTO: 1.9 10E3/UL (ref 0.8–5.3)
LYMPHOCYTES NFR BLD AUTO: 20 %
MCH RBC QN AUTO: 30.7 PG (ref 26.5–33)
MCHC RBC AUTO-ENTMCNC: 32.9 G/DL (ref 31.5–36.5)
MCV RBC AUTO: 93 FL (ref 78–100)
MONOCYTES # BLD AUTO: 1 10E3/UL (ref 0–1.3)
MONOCYTES NFR BLD AUTO: 11 %
NEUTROPHILS # BLD AUTO: 5.6 10E3/UL (ref 1.6–8.3)
NEUTROPHILS NFR BLD AUTO: 61 %
NRBC # BLD AUTO: 0 10E3/UL
NRBC BLD AUTO-RTO: 0 /100
PLATELET # BLD AUTO: 486 10E3/UL (ref 150–450)
POTASSIUM BLD-SCNC: 3.9 MMOL/L (ref 3.5–5)
PROT SERPL-MCNC: 5.9 G/DL (ref 6–8)
RBC # BLD AUTO: 3.71 10E6/UL (ref 3.8–5.2)
SODIUM SERPL-SCNC: 133 MMOL/L (ref 136–145)
VALPROATE SERPL-MCNC: 30 UG/ML
WBC # BLD AUTO: 9.2 10E3/UL (ref 4–11)

## 2022-06-20 PROCEDURE — 36415 COLL VENOUS BLD VENIPUNCTURE: CPT | Mod: ORL | Performed by: INTERNAL MEDICINE

## 2022-06-20 PROCEDURE — 82248 BILIRUBIN DIRECT: CPT | Mod: ORL | Performed by: INTERNAL MEDICINE

## 2022-06-20 PROCEDURE — 85025 COMPLETE CBC W/AUTO DIFF WBC: CPT | Mod: ORL | Performed by: INTERNAL MEDICINE

## 2022-06-20 PROCEDURE — 80053 COMPREHEN METABOLIC PANEL: CPT | Mod: ORL | Performed by: INTERNAL MEDICINE

## 2022-06-20 PROCEDURE — P9604 ONE-WAY ALLOW PRORATED TRIP: HCPCS | Mod: ORL | Performed by: INTERNAL MEDICINE

## 2022-06-20 PROCEDURE — 80164 ASSAY DIPROPYLACETIC ACD TOT: CPT | Mod: ORL | Performed by: INTERNAL MEDICINE

## 2023-01-01 ENCOUNTER — LAB REQUISITION (OUTPATIENT)
Dept: LAB | Facility: CLINIC | Age: 88
End: 2023-01-01
Payer: COMMERCIAL

## 2023-01-01 DIAGNOSIS — H43.393 VITREOUS SYNERESIS OF BOTH EYES: ICD-10-CM

## 2023-01-01 DIAGNOSIS — F03.918 UNSPECIFIED DEMENTIA, UNSPECIFIED SEVERITY, WITH OTHER BEHAVIORAL DISTURBANCE (H): ICD-10-CM

## 2023-01-01 DIAGNOSIS — N39.0 URINARY TRACT INFECTION, SITE NOT SPECIFIED: ICD-10-CM

## 2023-01-01 DIAGNOSIS — D72.820 LYMPHOCYTOSIS (SYMPTOMATIC): ICD-10-CM

## 2023-01-01 DIAGNOSIS — D72.822 PLASMACYTOSIS: ICD-10-CM

## 2023-01-01 DIAGNOSIS — H40.1133 PRIMARY OPEN ANGLE GLAUCOMA (POAG) OF BOTH EYES, SEVERE STAGE: Primary | ICD-10-CM

## 2023-01-01 DIAGNOSIS — R60.0 LOCALIZED EDEMA: ICD-10-CM

## 2023-01-01 DIAGNOSIS — K52.9 NONINFECTIVE GASTROENTERITIS AND COLITIS, UNSPECIFIED: ICD-10-CM

## 2023-01-01 DIAGNOSIS — R09.81 NASAL CONGESTION: ICD-10-CM

## 2023-01-01 DIAGNOSIS — R09.89 OTHER SPECIFIED SYMPTOMS AND SIGNS INVOLVING THE CIRCULATORY AND RESPIRATORY SYSTEMS: ICD-10-CM

## 2023-01-01 LAB
ALBUMIN SERPL BCG-MCNC: 2.2 G/DL (ref 3.5–5.2)
ALBUMIN SERPL BCG-MCNC: 2.3 G/DL (ref 3.5–5.2)
ALBUMIN SERPL BCG-MCNC: 3 G/DL (ref 3.5–5.2)
ALBUMIN UR-MCNC: 30 MG/DL
ALP SERPL-CCNC: 154 U/L (ref 35–104)
ALP SERPL-CCNC: 154 U/L (ref 35–104)
ALP SERPL-CCNC: 87 U/L (ref 35–104)
ALT SERPL W P-5'-P-CCNC: 13 U/L (ref 0–50)
ALT SERPL W P-5'-P-CCNC: 21 U/L (ref 0–50)
ALT SERPL W P-5'-P-CCNC: <5 U/L (ref 10–35)
ANION GAP SERPL CALCULATED.3IONS-SCNC: 11 MMOL/L (ref 7–15)
ANION GAP SERPL CALCULATED.3IONS-SCNC: 14 MMOL/L (ref 7–15)
ANION GAP SERPL CALCULATED.3IONS-SCNC: 8 MMOL/L (ref 7–15)
ANION GAP SERPL CALCULATED.3IONS-SCNC: 9 MMOL/L (ref 7–15)
APPEARANCE UR: ABNORMAL
AST SERPL W P-5'-P-CCNC: 20 U/L (ref 10–35)
AST SERPL W P-5'-P-CCNC: 30 U/L (ref 0–45)
AST SERPL W P-5'-P-CCNC: 40 U/L (ref 0–45)
BACTERIA #/AREA URNS HPF: ABNORMAL /HPF
BACTERIA UR CULT: ABNORMAL
BASOPHILS # BLD AUTO: 0.1 10E3/UL (ref 0–0.2)
BASOPHILS # BLD AUTO: 0.1 10E3/UL (ref 0–0.2)
BASOPHILS NFR BLD AUTO: 0 %
BASOPHILS NFR BLD AUTO: 1 %
BILIRUB DIRECT SERPL-MCNC: <0.2 MG/DL (ref 0–0.3)
BILIRUB SERPL-MCNC: 0.2 MG/DL
BILIRUB UR QL STRIP: NEGATIVE
BUN SERPL-MCNC: 26.9 MG/DL (ref 8–23)
BUN SERPL-MCNC: 26.9 MG/DL (ref 8–23)
BUN SERPL-MCNC: 34.6 MG/DL (ref 8–23)
BUN SERPL-MCNC: 36 MG/DL (ref 8–23)
CALCIUM SERPL-MCNC: 8.4 MG/DL (ref 8.8–10.2)
CALCIUM SERPL-MCNC: 8.7 MG/DL (ref 8.8–10.2)
CALCIUM SERPL-MCNC: 8.9 MG/DL (ref 8.8–10.2)
CALCIUM SERPL-MCNC: 9.5 MG/DL (ref 8.8–10.2)
CHLORIDE SERPL-SCNC: 102 MMOL/L (ref 98–107)
CHLORIDE SERPL-SCNC: 104 MMOL/L (ref 98–107)
CHLORIDE SERPL-SCNC: 106 MMOL/L (ref 98–107)
CHLORIDE SERPL-SCNC: 107 MMOL/L (ref 98–107)
COLOR UR AUTO: YELLOW
CREAT SERPL-MCNC: 0.38 MG/DL (ref 0.51–0.95)
CREAT SERPL-MCNC: 0.43 MG/DL (ref 0.51–0.95)
CREAT SERPL-MCNC: 0.53 MG/DL (ref 0.51–0.95)
CREAT SERPL-MCNC: 0.53 MG/DL (ref 0.51–0.95)
DEPRECATED HCO3 PLAS-SCNC: 21 MMOL/L (ref 22–29)
DEPRECATED HCO3 PLAS-SCNC: 23 MMOL/L (ref 22–29)
DEPRECATED HCO3 PLAS-SCNC: 25 MMOL/L (ref 22–29)
DEPRECATED HCO3 PLAS-SCNC: 27 MMOL/L (ref 22–29)
EOSINOPHIL # BLD AUTO: 0 10E3/UL (ref 0–0.7)
EOSINOPHIL # BLD AUTO: 0.4 10E3/UL (ref 0–0.7)
EOSINOPHIL NFR BLD AUTO: 0 %
EOSINOPHIL NFR BLD AUTO: 5 %
ERYTHROCYTE [DISTWIDTH] IN BLOOD BY AUTOMATED COUNT: 13.3 % (ref 10–15)
ERYTHROCYTE [DISTWIDTH] IN BLOOD BY AUTOMATED COUNT: 13.5 % (ref 10–15)
ERYTHROCYTE [DISTWIDTH] IN BLOOD BY AUTOMATED COUNT: 13.9 % (ref 10–15)
ERYTHROCYTE [DISTWIDTH] IN BLOOD BY AUTOMATED COUNT: 14 % (ref 10–15)
FLUAV RNA SPEC QL NAA+PROBE: NEGATIVE
FLUBV RNA RESP QL NAA+PROBE: NEGATIVE
GFR SERPL CREATININE-BSD FRML MDRD: 90 ML/MIN/1.73M2
GFR SERPL CREATININE-BSD FRML MDRD: 90 ML/MIN/1.73M2
GFR SERPL CREATININE-BSD FRML MDRD: >90 ML/MIN/1.73M2
GFR SERPL CREATININE-BSD FRML MDRD: >90 ML/MIN/1.73M2
GLUCOSE SERPL-MCNC: 151 MG/DL (ref 70–99)
GLUCOSE SERPL-MCNC: 65 MG/DL (ref 70–99)
GLUCOSE SERPL-MCNC: 73 MG/DL (ref 70–99)
GLUCOSE SERPL-MCNC: 92 MG/DL (ref 70–99)
GLUCOSE UR STRIP-MCNC: NEGATIVE MG/DL
HCT VFR BLD AUTO: 25.7 % (ref 35–47)
HCT VFR BLD AUTO: 26.1 % (ref 35–47)
HCT VFR BLD AUTO: 32.4 % (ref 35–47)
HCT VFR BLD AUTO: 39.4 % (ref 35–47)
HGB BLD-MCNC: 10.1 G/DL (ref 11.7–15.7)
HGB BLD-MCNC: 12.1 G/DL (ref 11.7–15.7)
HGB BLD-MCNC: 7.9 G/DL (ref 11.7–15.7)
HGB BLD-MCNC: 8.1 G/DL (ref 11.7–15.7)
HGB UR QL STRIP: NEGATIVE
IMM GRANULOCYTES # BLD: 0 10E3/UL
IMM GRANULOCYTES # BLD: 1 10E3/UL
IMM GRANULOCYTES NFR BLD: 1 %
IMM GRANULOCYTES NFR BLD: 4 %
KETONES UR STRIP-MCNC: ABNORMAL MG/DL
LEUKOCYTE ESTERASE UR QL STRIP: ABNORMAL
LYMPHOCYTES # BLD AUTO: 1.8 10E3/UL (ref 0.8–5.3)
LYMPHOCYTES # BLD AUTO: 2.1 10E3/UL (ref 0.8–5.3)
LYMPHOCYTES NFR BLD AUTO: 26 %
LYMPHOCYTES NFR BLD AUTO: 7 %
MCH RBC QN AUTO: 29.3 PG (ref 26.5–33)
MCH RBC QN AUTO: 30 PG (ref 26.5–33)
MCH RBC QN AUTO: 32.1 PG (ref 26.5–33)
MCH RBC QN AUTO: 32.2 PG (ref 26.5–33)
MCHC RBC AUTO-ENTMCNC: 30.7 G/DL (ref 31.5–36.5)
MCHC RBC AUTO-ENTMCNC: 30.7 G/DL (ref 31.5–36.5)
MCHC RBC AUTO-ENTMCNC: 31 G/DL (ref 31.5–36.5)
MCHC RBC AUTO-ENTMCNC: 31.2 G/DL (ref 31.5–36.5)
MCV RBC AUTO: 103 FL (ref 78–100)
MCV RBC AUTO: 105 FL (ref 78–100)
MCV RBC AUTO: 95 FL (ref 78–100)
MCV RBC AUTO: 97 FL (ref 78–100)
MONOCYTES # BLD AUTO: 0.7 10E3/UL (ref 0–1.3)
MONOCYTES # BLD AUTO: 2.1 10E3/UL (ref 0–1.3)
MONOCYTES NFR BLD AUTO: 8 %
MONOCYTES NFR BLD AUTO: 9 %
MUCOUS THREADS #/AREA URNS LPF: PRESENT /LPF
NEUTROPHILS # BLD AUTO: 21.2 10E3/UL (ref 1.6–8.3)
NEUTROPHILS # BLD AUTO: 4.8 10E3/UL (ref 1.6–8.3)
NEUTROPHILS NFR BLD AUTO: 58 %
NEUTROPHILS NFR BLD AUTO: 81 %
NITRATE UR QL: POSITIVE
NRBC # BLD AUTO: 0 10E3/UL
NRBC # BLD AUTO: 0 10E3/UL
NRBC BLD AUTO-RTO: 0 /100
NRBC BLD AUTO-RTO: 0 /100
NT-PROBNP SERPL-MCNC: 1045 PG/ML (ref 0–1800)
PATH REPORT.COMMENTS IMP SPEC: NORMAL
PATH REPORT.COMMENTS IMP SPEC: NORMAL
PATH REPORT.FINAL DX SPEC: NORMAL
PATH REPORT.MICROSCOPIC SPEC OTHER STN: NORMAL
PATH REPORT.MICROSCOPIC SPEC OTHER STN: NORMAL
PATH REPORT.RELEVANT HX SPEC: NORMAL
PH UR STRIP: 6 [PH] (ref 5–7)
PLATELET # BLD AUTO: 347 10E3/UL (ref 150–450)
PLATELET # BLD AUTO: 384 10E3/UL (ref 150–450)
PLATELET # BLD AUTO: 719 10E3/UL (ref 150–450)
PLATELET # BLD AUTO: 754 10E3/UL (ref 150–450)
POTASSIUM SERPL-SCNC: 3.9 MMOL/L (ref 3.4–5.3)
POTASSIUM SERPL-SCNC: 4.5 MMOL/L (ref 3.4–5.3)
POTASSIUM SERPL-SCNC: 4.6 MMOL/L (ref 3.4–5.3)
POTASSIUM SERPL-SCNC: 5.4 MMOL/L (ref 3.4–5.3)
PROCALCITONIN SERPL IA-MCNC: 0.83 NG/ML
PROT SERPL-MCNC: 4.9 G/DL (ref 6.4–8.3)
PROT SERPL-MCNC: 5.1 G/DL (ref 6.4–8.3)
PROT SERPL-MCNC: 5.7 G/DL (ref 6.4–8.3)
RBC # BLD AUTO: 2.7 10E6/UL (ref 3.8–5.2)
RBC # BLD AUTO: 2.7 10E6/UL (ref 3.8–5.2)
RBC # BLD AUTO: 3.15 10E6/UL (ref 3.8–5.2)
RBC # BLD AUTO: 3.76 10E6/UL (ref 3.8–5.2)
RBC URINE: 1 /HPF
RETICS # AUTO: 0.07 10E6/UL (ref 0.03–0.1)
RETICS/RBC NFR AUTO: 2.7 % (ref 0.5–2)
RSV RNA SPEC NAA+PROBE: NEGATIVE
SARS-COV-2 RNA RESP QL NAA+PROBE: NEGATIVE
SODIUM SERPL-SCNC: 136 MMOL/L (ref 136–145)
SODIUM SERPL-SCNC: 139 MMOL/L (ref 136–145)
SODIUM SERPL-SCNC: 140 MMOL/L (ref 136–145)
SODIUM SERPL-SCNC: 142 MMOL/L (ref 136–145)
SP GR UR STRIP: 1.03 (ref 1–1.03)
SQUAMOUS EPITHELIAL: 17 /HPF
TRANSITIONAL EPI: 2 /HPF
UROBILINOGEN UR STRIP-MCNC: NORMAL MG/DL
VALPROATE SERPL-MCNC: 18.5 UG/ML
WBC # BLD AUTO: 26.2 10E3/UL (ref 4–11)
WBC # BLD AUTO: 26.5 10E3/UL (ref 4–11)
WBC # BLD AUTO: 6 10E3/UL (ref 4–11)
WBC # BLD AUTO: 8.1 10E3/UL (ref 4–11)
WBC CLUMPS #/AREA URNS HPF: PRESENT /HPF
WBC URINE: 93 /HPF
YEAST #/AREA URNS HPF: ABNORMAL /HPF

## 2023-01-01 PROCEDURE — 85045 AUTOMATED RETICULOCYTE COUNT: CPT | Mod: ORL | Performed by: INTERNAL MEDICINE

## 2023-01-01 PROCEDURE — 36415 COLL VENOUS BLD VENIPUNCTURE: CPT | Mod: ORL

## 2023-01-01 PROCEDURE — 85025 COMPLETE CBC W/AUTO DIFF WBC: CPT | Mod: ORL

## 2023-01-01 PROCEDURE — P9603 ONE-WAY ALLOW PRORATED MILES: HCPCS | Mod: ORL | Performed by: NURSE PRACTITIONER

## 2023-01-01 PROCEDURE — 87637 SARSCOV2&INF A&B&RSV AMP PRB: CPT | Mod: ORL

## 2023-01-01 PROCEDURE — 36415 COLL VENOUS BLD VENIPUNCTURE: CPT | Mod: ORL | Performed by: INTERNAL MEDICINE

## 2023-01-01 PROCEDURE — 80164 ASSAY DIPROPYLACETIC ACD TOT: CPT | Mod: ORL

## 2023-01-01 PROCEDURE — 99207 BLOOD MORPHOLOGY PATHOLOGIST REVIEW: CPT | Performed by: PATHOLOGY

## 2023-01-01 PROCEDURE — 85027 COMPLETE CBC AUTOMATED: CPT | Mod: ORL

## 2023-01-01 PROCEDURE — P9604 ONE-WAY ALLOW PRORATED TRIP: HCPCS | Mod: ORL

## 2023-01-01 PROCEDURE — 36415 COLL VENOUS BLD VENIPUNCTURE: CPT | Mod: ORL | Performed by: NURSE PRACTITIONER

## 2023-01-01 PROCEDURE — 82248 BILIRUBIN DIRECT: CPT | Mod: ORL

## 2023-01-01 PROCEDURE — 80053 COMPREHEN METABOLIC PANEL: CPT | Mod: ORL | Performed by: NURSE PRACTITIONER

## 2023-01-01 PROCEDURE — 85045 AUTOMATED RETICULOCYTE COUNT: CPT | Mod: ORL

## 2023-01-01 PROCEDURE — P9604 ONE-WAY ALLOW PRORATED TRIP: HCPCS | Mod: ORL | Performed by: NURSE PRACTITIONER

## 2023-01-01 PROCEDURE — 80164 ASSAY DIPROPYLACETIC ACD TOT: CPT | Mod: ORL | Performed by: INTERNAL MEDICINE

## 2023-01-01 PROCEDURE — 85027 COMPLETE CBC AUTOMATED: CPT | Mod: ORL | Performed by: NURSE PRACTITIONER

## 2023-01-01 PROCEDURE — 83880 ASSAY OF NATRIURETIC PEPTIDE: CPT | Mod: ORL | Performed by: NURSE PRACTITIONER

## 2023-01-01 PROCEDURE — 84145 PROCALCITONIN (PCT): CPT | Mod: ORL | Performed by: NURSE PRACTITIONER

## 2023-01-01 PROCEDURE — P9604 ONE-WAY ALLOW PRORATED TRIP: HCPCS | Mod: ORL | Performed by: INTERNAL MEDICINE

## 2023-01-01 PROCEDURE — 85025 COMPLETE CBC W/AUTO DIFF WBC: CPT | Mod: ORL | Performed by: NURSE PRACTITIONER

## 2023-01-01 PROCEDURE — 82248 BILIRUBIN DIRECT: CPT | Mod: ORL | Performed by: INTERNAL MEDICINE

## 2023-01-01 PROCEDURE — 87637 SARSCOV2&INF A&B&RSV AMP PRB: CPT | Mod: ORL | Performed by: INTERNAL MEDICINE

## 2023-01-01 PROCEDURE — 82248 BILIRUBIN DIRECT: CPT | Mod: ORL | Performed by: NURSE PRACTITIONER

## 2023-01-01 PROCEDURE — 85027 COMPLETE CBC AUTOMATED: CPT | Mod: ORL | Performed by: INTERNAL MEDICINE

## 2023-01-01 PROCEDURE — 81001 URINALYSIS AUTO W/SCOPE: CPT | Mod: ORL

## 2023-01-01 PROCEDURE — 82310 ASSAY OF CALCIUM: CPT | Mod: ORL | Performed by: INTERNAL MEDICINE

## 2023-01-01 PROCEDURE — 80048 BASIC METABOLIC PNL TOTAL CA: CPT | Mod: ORL | Performed by: NURSE PRACTITIONER

## 2023-01-01 PROCEDURE — 87186 SC STD MICRODIL/AGAR DIL: CPT | Mod: ORL | Performed by: INTERNAL MEDICINE

## 2023-01-01 PROCEDURE — 87186 SC STD MICRODIL/AGAR DIL: CPT | Mod: ORL

## 2023-01-01 PROCEDURE — 81001 URINALYSIS AUTO W/SCOPE: CPT | Mod: ORL | Performed by: INTERNAL MEDICINE

## 2023-01-01 PROCEDURE — 85025 COMPLETE CBC W/AUTO DIFF WBC: CPT | Mod: ORL | Performed by: INTERNAL MEDICINE

## (undated) DEVICE — NDL 25GA 1.5" 305127

## (undated) DEVICE — ESU CORD BIPOLAR GREEN 10-4000

## (undated) DEVICE — EYE NDL RETROBULBAR 25GA 60-111637

## (undated) DEVICE — TAPE MICROPORE 1"X1.5YD 1530S-1

## (undated) DEVICE — EYE PREP BETADINE 5% SOLUTION 30ML 0065-0411-30

## (undated) DEVICE — SOL WATER 10ML VIAL 6332318510

## (undated) DEVICE — BLADE KNIFE BEAVER MICROSHARP GREEN 377515

## (undated) DEVICE — NDL 23GA 1" 305145

## (undated) DEVICE — APPLICATOR COTTON TIP 6"X2 STERILE LF 6012

## (undated) DEVICE — SYR 01ML 27GA 0.5" NDL TBC 309623

## (undated) DEVICE — SU VICRYL 7-0 TG140-8DA 18" J546G

## (undated) DEVICE — EYE SHIELD PLASTIC

## (undated) DEVICE — NDL 30GA 0.5" 305106

## (undated) DEVICE — EYE DRSG PAD OVAL

## (undated) DEVICE — EYE KNIFE STILETTO VISITEC 1.1MM ANG 45DEG SIDEPORT 376620

## (undated) DEVICE — PACK CATARACT CUSTOM ASC SEY15CPUMC

## (undated) DEVICE — GLOVE PROTEXIS MICRO 7.5  2D73PM75

## (undated) DEVICE — SU ETHILON 10-0 CS160-6 12" 9000G

## (undated) DEVICE — SYR 05ML LL W/O NDL

## (undated) DEVICE — LINEN TOWEL PACK X5 5464

## (undated) DEVICE — SU ETHILON 8-0 TG175-8 12" 1716G

## (undated) DEVICE — EYE SPONGE SPEAR WECK CEL 0008685

## (undated) RX ORDER — ACETAMINOPHEN 325 MG/1
TABLET ORAL
Status: DISPENSED
Start: 2018-01-24

## (undated) RX ORDER — ONDANSETRON 2 MG/ML
INJECTION INTRAMUSCULAR; INTRAVENOUS
Status: DISPENSED
Start: 2018-01-24

## (undated) RX ORDER — FENTANYL CITRATE 50 UG/ML
INJECTION, SOLUTION INTRAMUSCULAR; INTRAVENOUS
Status: DISPENSED
Start: 2018-01-24

## (undated) RX ORDER — CEFAZOLIN SODIUM 500 MG/2.2ML
INJECTION, POWDER, FOR SOLUTION INTRAMUSCULAR; INTRAVENOUS
Status: DISPENSED
Start: 2018-01-24

## (undated) RX ORDER — CEFAZOLIN SODIUM 1 G/3ML
INJECTION, POWDER, FOR SOLUTION INTRAMUSCULAR; INTRAVENOUS
Status: DISPENSED
Start: 2018-01-24

## (undated) RX ORDER — PROPOFOL 10 MG/ML
INJECTION, EMULSION INTRAVENOUS
Status: DISPENSED
Start: 2018-01-24

## (undated) RX ORDER — DEXAMETHASONE SODIUM PHOSPHATE 4 MG/ML
INJECTION, SOLUTION INTRA-ARTICULAR; INTRALESIONAL; INTRAMUSCULAR; INTRAVENOUS; SOFT TISSUE
Status: DISPENSED
Start: 2018-01-24